# Patient Record
Sex: FEMALE | Race: ASIAN | NOT HISPANIC OR LATINO | Employment: FULL TIME | URBAN - METROPOLITAN AREA
[De-identification: names, ages, dates, MRNs, and addresses within clinical notes are randomized per-mention and may not be internally consistent; named-entity substitution may affect disease eponyms.]

---

## 2023-04-13 DIAGNOSIS — B00.1 COLD SORE: Primary | ICD-10-CM

## 2023-04-13 RX ORDER — VALACYCLOVIR HYDROCHLORIDE 1 G/1
1000 TABLET, FILM COATED ORAL 2 TIMES DAILY
Qty: 10 TABLET | Refills: 2 | Status: SHIPPED | OUTPATIENT
Start: 2023-04-13 | End: 2023-04-14

## 2023-07-10 DIAGNOSIS — G43.109 MIGRAINE WITH VERTIGO: ICD-10-CM

## 2023-07-11 RX ORDER — ONDANSETRON 4 MG/1
4 TABLET, ORALLY DISINTEGRATING ORAL EVERY 6 HOURS PRN
Qty: 20 TABLET | Refills: 0 | Status: SHIPPED | OUTPATIENT
Start: 2023-07-11

## 2023-07-20 ENCOUNTER — APPOINTMENT (OUTPATIENT)
Dept: LAB | Facility: HOSPITAL | Age: 58
End: 2023-07-20

## 2023-07-20 DIAGNOSIS — Z00.8 ENCOUNTER FOR OTHER GENERAL EXAMINATION: ICD-10-CM

## 2023-07-20 LAB
CHOLEST SERPL-MCNC: 190 MG/DL
EST. AVERAGE GLUCOSE BLD GHB EST-MCNC: 114 MG/DL
HBA1C MFR BLD: 5.6 %
HDLC SERPL-MCNC: 86 MG/DL
LDLC SERPL CALC-MCNC: 89 MG/DL (ref 0–100)
NONHDLC SERPL-MCNC: 104 MG/DL
TRIGL SERPL-MCNC: 73 MG/DL

## 2023-07-20 PROCEDURE — 83036 HEMOGLOBIN GLYCOSYLATED A1C: CPT

## 2023-07-20 PROCEDURE — 36415 COLL VENOUS BLD VENIPUNCTURE: CPT

## 2023-07-20 PROCEDURE — 80061 LIPID PANEL: CPT

## 2024-02-21 PROBLEM — Z01.419 ENCOUNTER FOR ANNUAL ROUTINE GYNECOLOGICAL EXAMINATION: Status: RESOLVED | Noted: 2019-10-02 | Resolved: 2024-02-21

## 2024-04-08 ENCOUNTER — OFFICE VISIT (OUTPATIENT)
Dept: FAMILY MEDICINE CLINIC | Facility: CLINIC | Age: 59
End: 2024-04-08
Payer: COMMERCIAL

## 2024-04-08 VITALS
DIASTOLIC BLOOD PRESSURE: 74 MMHG | BODY MASS INDEX: 24.15 KG/M2 | TEMPERATURE: 97.3 F | HEIGHT: 60 IN | RESPIRATION RATE: 16 BRPM | WEIGHT: 123 LBS | HEART RATE: 58 BPM | SYSTOLIC BLOOD PRESSURE: 110 MMHG | OXYGEN SATURATION: 99 %

## 2024-04-08 DIAGNOSIS — R53.83 FATIGUE, UNSPECIFIED TYPE: ICD-10-CM

## 2024-04-08 DIAGNOSIS — G43.109 MIGRAINE WITH VERTIGO: ICD-10-CM

## 2024-04-08 DIAGNOSIS — Z23 ENCOUNTER FOR IMMUNIZATION: ICD-10-CM

## 2024-04-08 DIAGNOSIS — Z12.31 ENCOUNTER FOR SCREENING MAMMOGRAM FOR BREAST CANCER: ICD-10-CM

## 2024-04-08 DIAGNOSIS — Z13.220 SCREENING, LIPID: ICD-10-CM

## 2024-04-08 DIAGNOSIS — Z13.1 SCREENING FOR DIABETES MELLITUS: ICD-10-CM

## 2024-04-08 DIAGNOSIS — Z00.00 ANNUAL PHYSICAL EXAM: Primary | ICD-10-CM

## 2024-04-08 PROCEDURE — 99396 PREV VISIT EST AGE 40-64: CPT | Performed by: PHYSICIAN ASSISTANT

## 2024-04-08 PROCEDURE — 90750 HZV VACC RECOMBINANT IM: CPT

## 2024-04-08 PROCEDURE — 90471 IMMUNIZATION ADMIN: CPT

## 2024-04-08 RX ORDER — ONDANSETRON 4 MG/1
4 TABLET, ORALLY DISINTEGRATING ORAL EVERY 6 HOURS PRN
Qty: 20 TABLET | Refills: 1 | Status: SHIPPED | OUTPATIENT
Start: 2024-04-08

## 2024-04-08 NOTE — PROGRESS NOTES
ADULT ANNUAL PHYSICAL  [facility]    NAME: Romero Guerin  AGE: 58 y.o. SEX: female  : 1965     DATE: 2024     Assessment and Plan:     Healthy 58 year old female    Immunizations and preventive care screenings were discussed with patient today. Appropriate education was printed on patient's after visit summary.    Counseling:  Alcohol/drug use: discussed moderation in alcohol intake, the recommendations for healthy alcohol use, and avoidance of illicit drug use.  Dental Health: discussed importance of regular tooth brushing, flossing, and dental visits.  Injury prevention: discussed safety/seat belts, safety helmets, smoke detectors, carbon dioxide detectors, and smoking near bedding or upholstery.  Sexual health: discussed sexually transmitted diseases, partner selection, use of condoms, avoidance of unintended pregnancy, and contraceptive alternatives.  Exercise: the importance of regular exercise/physical activity was discussed. Recommend exercise 3-5 times per week for at least 30 minutes.   Labs ordered  Shingrix #1       Follow up 2-6 months for Shingrix #2  Return in 1 year (on 2025).     Chief Complaint:     Chief Complaint   Patient presents with    Physical Exam      History of Present Illness:     Adult Annual Physical   Patient here for a comprehensive physical exam. The patient reports no problems.    Diet and Physical Activity  Diet/Nutrition: well balanced diet.   Exercise: walking.      Depression Screening  PHQ-2/9 Depression Screening    Little interest or pleasure in doing things: 0 - not at all  Feeling down, depressed, or hopeless: 0 - not at all  PHQ-2 Score: 0  PHQ-2 Interpretation: Negative depression screen       General Health  Sleep: sleeps well.   Hearing: normal - bilateral.  Vision: goes for regular eye exams and wears glasses.   Dental: regular dental visits and brushes teeth twice daily.        /GYN Health  Follows with gynecology? yes   Patient is: postmenopausal       Review of Systems:     Review of Systems   Constitutional: Negative.    HENT: Negative.     Eyes: Negative.    Respiratory: Negative.     Cardiovascular: Negative.    Gastrointestinal: Negative.    Endocrine: Negative.    Genitourinary: Negative.    Musculoskeletal: Negative.    Skin: Negative.    Allergic/Immunologic: Negative.    Neurological: Negative.    Hematological: Negative.    Psychiatric/Behavioral: Negative.        Past Medical History:     Past Medical History:   Diagnosis Date    Hepatitis B     Herpes     Infectious viral hepatitis     Hepatitis B--treated    Migraine     Miscarriage     Syphilis     patient is unsure of this, same incident as what she thought was herpes      Past Surgical History:     Past Surgical History:   Procedure Laterality Date     SECTION      COLONOSCOPY      2017-wnl repeat 10 years    FINGER SURGERY      MOUTH SURGERY      tooth implant and some extractions    WISDOM TOOTH EXTRACTION        Social History:     Social History     Socioeconomic History    Marital status:      Spouse name: Duc    Number of children: 2    Years of education: None    Highest education level: Associate degree: academic program   Occupational History    Occupation: RN-PACU- [employer]   Tobacco Use    Smoking status: Never    Smokeless tobacco: Never   Vaping Use    Vaping status: Never Used   Substance and Sexual Activity    Alcohol use: Yes     Comment: Rarely-once or twice a year    Drug use: Never    Sexual activity: Not Currently     Partners: Male     Birth control/protection: Abstinence     Comment: Lifetime partners: 2;  2016   Other Topics Concern    None   Social History Narrative    [employer]: no preference    Accepts blood products        Exercise: 2-3x/week    Calcium: 4 oz milk daily, 1 cheese a few times/week, 1 yogurt 2-3x.week, 1 multivitamin daily (for women >50)      Social Determinants of Health     Financial Resource Strain: Low Risk  (12/28/2020)    Overall Financial Resource Strain (CARDIA)     Difficulty of Paying Living Expenses: Not hard at all   Food Insecurity: No Food Insecurity (12/28/2020)    Hunger Vital Sign     Worried About Running Out of Food in the Last Year: Never true     Ran Out of Food in the Last Year: Never true   Transportation Needs: No Transportation Needs (12/28/2020)    PRAPARE - Transportation     Lack of Transportation (Medical): No     Lack of Transportation (Non-Medical): No   Physical Activity: Insufficiently Active (3/25/2019)    Exercise Vital Sign     Days of Exercise per Week: 2 days     Minutes of Exercise per Session: 30 min   Stress: No Stress Concern Present (3/25/2019)    [employer] - Occupational Stress Questionnaire     Feeling of Stress : Not at all   Social Connections: Moderately Isolated (3/25/2019)    Social Connection and Isolation Panel [NHANES]     Frequency of Communication with Friends and Family: Twice a week     Frequency of Social Gatherings with Friends and Family: Twice a week     Attends Jew Services: Never     Active Member of Clubs or Organizations: No     Attends Club or Organization Meetings: Never     Marital Status:    Intimate Partner Violence: Not At Risk (3/25/2019)    Humiliation, Afraid, Rape, and Kick questionnaire     Fear of Current or Ex-Partner: No     Emotionally Abused: No     Physically Abused: No     Sexually Abused: No   Housing Stability: Not on file      Family History:     Family History   Problem Relation Age of Onset    Hyperlipidemia Mother     Osteoporosis Mother     Hypertension Father     No Known Problems Sister     Hyperlipidemia Brother     No Known Problems Brother     No Known Problems Brother     Other Brother         meningiocele    No Known Problems Brother     Other Brother         missing in [place]    Substance Abuse Daughter      Mental illness Daughter     Mental illness Son     No Known Problems Maternal Grandmother     No Known Problems Maternal Grandfather     No Known Problems Paternal Grandmother     No Known Problems Paternal Grandfather     No Known Problems Maternal Aunt     No Known Problems Maternal Aunt     Breast cancer Paternal Aunt 60        fathers half sister; ? gyn ca as well?    No Known Problems Paternal Aunt     No Known Problems Paternal Aunt     Ovarian cancer Neg Hx     Colon cancer Neg Hx     Alcohol abuse Neg Hx     Uterine cancer Neg Hx       Current Medications:     Current Outpatient Medications   Medication Sig Dispense Refill    Biotin 1000 MCG tablet Take 1,000 mcg by mouth daily       fexofenadine (ALLEGRA) 180 MG tablet Take 180 mg by mouth if needed      fluticasone (FLONASE) 50 mcg/act nasal spray 1 spray into each nostril if needed      ibuprofen (MOTRIN) 200 mg tablet Take by mouth every 6 (six) hours as needed for mild pain      loratadine (CLARITIN) 10 mg tablet Take 10 mg by mouth if needed      magnesium oxide-pyridoxine (BEELITH) 362-20 MG TABS Take 1 tablet by mouth daily      meclizine (ANTIVERT) 12.5 MG tablet Take by mouth 3 (three) times a day as needed for dizziness      meloxicam (Mobic) 15 mg tablet Take 1 tablet (15 mg total) by mouth daily (Patient taking differently: Take 15 mg by mouth if needed) 30 tablet 0    Multiple Vitamin (MULTIVITAMIN) capsule Take 1 capsule by mouth daily      ondansetron (Zofran ODT) 4 mg disintegrating tablet Take 1 tablet (4 mg total) by mouth every 6 (six) hours as needed for nausea or vomiting 20 tablet 0    Turmeric 500 MG CAPS Take by mouth      estradiol (ESTRACE VAGINAL) 0.1 mg/g vaginal cream Insert one half gram vaginally twice weekly (Patient not taking: Reported on 4/8/2024) 42.5 g 0    valACYclovir (VALTREX) 1,000 mg tablet Take 1 tablet (1,000 mg total) by mouth 2 (two) times a day for 1 day 10 tablet 2     No current facility-administered  medications for this visit.      Allergies:     Allergies   Allergen Reactions    Sulfamethoxazole-Trimethoprim Hives, Itching and Rash      Physical Exam:     /74   Pulse 58   Temp (!) 97.3 °F (36.3 °C) (Temporal)   Resp 16   Ht 5' (1.524 m)   Wt 55.8 kg (123 lb)   LMP 12/23/2020 (Exact Date)   SpO2 99%   BMI 24.02 kg/m²     Physical Exam  Constitutional:       Appearance: Normal appearance. She is well-developed and normal weight.   HENT:      Head: Normocephalic and atraumatic.      Right Ear: Hearing, tympanic membrane, ear canal and external ear normal.      Left Ear: Hearing, tympanic membrane, ear canal and external ear normal.      Nose: Nose normal.      Mouth/Throat:      Mouth: Mucous membranes are moist.      Pharynx: Oropharynx is clear. Uvula midline.   Eyes:      Extraocular Movements: Extraocular movements intact.      Conjunctiva/sclera: Conjunctivae normal.      Pupils: Pupils are equal, round, and reactive to light.   Neck:      Thyroid: No thyromegaly.   Cardiovascular:      Rate and Rhythm: Normal rate and regular rhythm.      Heart sounds: Normal heart sounds. No murmur heard.  Pulmonary:      Effort: Pulmonary effort is normal.      Breath sounds: Normal breath sounds.   Abdominal:      General: Bowel sounds are normal. There is no distension.      Palpations: Abdomen is soft. There is no mass.      Tenderness: There is no abdominal tenderness.   Musculoskeletal:         General: Normal range of motion.      Cervical back: Normal range of motion and neck supple.   Lymphadenopathy:      Cervical: No cervical adenopathy.   Skin:     General: Skin is warm.   Neurological:      General: No focal deficit present.      Mental Status: She is alert and oriented to person, place, and time.      Cranial Nerves: No cranial nerve deficit.      Deep Tendon Reflexes: Reflexes normal.   Psychiatric:         Mood and Affect: Mood normal.         Behavior: Behavior normal.         Thought  Content: Thought content normal.         Judgment: Judgment normal.          Estefany Ramirez PA-C  [facility]

## 2024-04-09 ENCOUNTER — TELEPHONE (OUTPATIENT)
Dept: FAMILY MEDICINE CLINIC | Facility: CLINIC | Age: 59
End: 2024-04-09

## 2024-04-09 NOTE — TELEPHONE ENCOUNTER
Patient is calling to let us know she had a shingles vaccine yesterday and now is red, swelling and warm, headache and body aches.     Informed patient that is a normal side effect from  the vaccine needs to apply ice on/off at least, take tylenol for the pain and call us in a few days if is not better.

## 2024-04-11 ENCOUNTER — APPOINTMENT (OUTPATIENT)
Dept: LAB | Facility: HOSPITAL | Age: 59
End: 2024-04-11
Payer: COMMERCIAL

## 2024-04-11 DIAGNOSIS — Z13.1 SCREENING FOR DIABETES MELLITUS: ICD-10-CM

## 2024-04-11 DIAGNOSIS — R53.83 FATIGUE, UNSPECIFIED TYPE: ICD-10-CM

## 2024-04-11 DIAGNOSIS — Z13.220 SCREENING, LIPID: ICD-10-CM

## 2024-04-11 LAB
ALBUMIN SERPL BCP-MCNC: 4.3 G/DL (ref 3.5–5)
ALP SERPL-CCNC: 64 U/L (ref 34–104)
ALT SERPL W P-5'-P-CCNC: 13 U/L (ref 7–52)
ANION GAP SERPL CALCULATED.3IONS-SCNC: 8 MMOL/L (ref 4–13)
AST SERPL W P-5'-P-CCNC: 18 U/L (ref 13–39)
BASOPHILS # BLD AUTO: 0.01 THOUSANDS/ÂΜL (ref 0–0.1)
BASOPHILS NFR BLD AUTO: 0 % (ref 0–1)
BILIRUB SERPL-MCNC: 0.45 MG/DL (ref 0.2–1)
BUN SERPL-MCNC: 11 MG/DL (ref 5–25)
CALCIUM SERPL-MCNC: 8.8 MG/DL (ref 8.4–10.2)
CHLORIDE SERPL-SCNC: 105 MMOL/L (ref 96–108)
CHOLEST SERPL-MCNC: 190 MG/DL
CO2 SERPL-SCNC: 27 MMOL/L (ref 21–32)
CREAT SERPL-MCNC: 0.65 MG/DL (ref 0.6–1.3)
EOSINOPHIL # BLD AUTO: 0.06 THOUSAND/ÂΜL (ref 0–0.61)
EOSINOPHIL NFR BLD AUTO: 1 % (ref 0–6)
ERYTHROCYTE [DISTWIDTH] IN BLOOD BY AUTOMATED COUNT: 13.2 % (ref 11.6–15.1)
EST. AVERAGE GLUCOSE BLD GHB EST-MCNC: 123 MG/DL
GFR SERPL CREATININE-BSD FRML MDRD: 98 ML/MIN/1.73SQ M
GLUCOSE P FAST SERPL-MCNC: 78 MG/DL (ref 65–99)
HBA1C MFR BLD: 5.9 %
HCT VFR BLD AUTO: 39.1 % (ref 34.8–46.1)
HDLC SERPL-MCNC: 92 MG/DL
HGB BLD-MCNC: 12.8 G/DL (ref 11.5–15.4)
IMM GRANULOCYTES # BLD AUTO: 0.01 THOUSAND/UL (ref 0–0.2)
IMM GRANULOCYTES NFR BLD AUTO: 0 % (ref 0–2)
LDLC SERPL CALC-MCNC: 85 MG/DL (ref 0–100)
LYMPHOCYTES # BLD AUTO: 1.68 THOUSANDS/ÂΜL (ref 0.6–4.47)
LYMPHOCYTES NFR BLD AUTO: 39 % (ref 14–44)
MCH RBC QN AUTO: 29.3 PG (ref 26.8–34.3)
MCHC RBC AUTO-ENTMCNC: 32.7 G/DL (ref 31.4–37.4)
MCV RBC AUTO: 90 FL (ref 82–98)
MONOCYTES # BLD AUTO: 0.59 THOUSAND/ÂΜL (ref 0.17–1.22)
MONOCYTES NFR BLD AUTO: 14 % (ref 4–12)
NEUTROPHILS # BLD AUTO: 1.98 THOUSANDS/ÂΜL (ref 1.85–7.62)
NEUTS SEG NFR BLD AUTO: 46 % (ref 43–75)
NRBC BLD AUTO-RTO: 0 /100 WBCS
PLATELET # BLD AUTO: 160 THOUSANDS/UL (ref 149–390)
PMV BLD AUTO: 10 FL (ref 8.9–12.7)
POTASSIUM SERPL-SCNC: 4 MMOL/L (ref 3.5–5.3)
PROT SERPL-MCNC: 7.4 G/DL (ref 6.4–8.4)
RBC # BLD AUTO: 4.37 MILLION/UL (ref 3.81–5.12)
SODIUM SERPL-SCNC: 140 MMOL/L (ref 135–147)
TRIGL SERPL-MCNC: 64 MG/DL
TSH SERPL DL<=0.05 MIU/L-ACNC: 0.81 UIU/ML (ref 0.45–4.5)
WBC # BLD AUTO: 4.33 THOUSAND/UL (ref 4.31–10.16)

## 2024-04-11 PROCEDURE — 85025 COMPLETE CBC W/AUTO DIFF WBC: CPT

## 2024-04-11 PROCEDURE — 36415 COLL VENOUS BLD VENIPUNCTURE: CPT

## 2024-04-11 PROCEDURE — 80053 COMPREHEN METABOLIC PANEL: CPT

## 2024-04-11 PROCEDURE — 84443 ASSAY THYROID STIM HORMONE: CPT

## 2024-04-11 PROCEDURE — 83036 HEMOGLOBIN GLYCOSYLATED A1C: CPT

## 2024-04-11 PROCEDURE — 80061 LIPID PANEL: CPT

## 2024-04-15 ENCOUNTER — ANNUAL EXAM (OUTPATIENT)
Dept: OBGYN CLINIC | Facility: CLINIC | Age: 59
End: 2024-04-15
Payer: COMMERCIAL

## 2024-04-15 VITALS
SYSTOLIC BLOOD PRESSURE: 102 MMHG | HEIGHT: 60 IN | WEIGHT: 121.6 LBS | BODY MASS INDEX: 23.87 KG/M2 | DIASTOLIC BLOOD PRESSURE: 58 MMHG

## 2024-04-15 DIAGNOSIS — Z01.419 ENCOUNTER FOR GYNECOLOGICAL EXAMINATION WITHOUT ABNORMAL FINDING: Primary | ICD-10-CM

## 2024-04-15 DIAGNOSIS — Z12.31 ENCOUNTER FOR SCREENING MAMMOGRAM FOR BREAST CANCER: ICD-10-CM

## 2024-04-15 DIAGNOSIS — N95.2 ATROPHIC VAGINITIS: ICD-10-CM

## 2024-04-15 PROCEDURE — S0612 ANNUAL GYNECOLOGICAL EXAMINA: HCPCS | Performed by: NURSE PRACTITIONER

## 2024-04-15 NOTE — PROGRESS NOTES
Assessment / Plan    1. Encounter for gynecological examination without abnormal finding  Normal well woman exam  2021 pap/hpv negative.  Repeat next year.  Up to date on colonoscopy    2. Encounter for screening mammogram for breast cancer  Has appt scheduled.    3. Atrophic vaginitis  Patient declines use of estrogen vaginal cream, as she is not sexually active.        Latisha Brito is a 58 y.o. female who presents for her annual gynecologic exam.    57 yo   2021 pap/hpv negative  Pap hx NL  STD hx: remote hx of HSV, GC  2023 mammo negative; has scheduled 2024  Breast ca pat aunt  3/2017 colonoscopy, rpt 10 yr    Last pap:   Pap Hx: NL  STD hx: GC, ? Syphilis-   Sexually active: not currently;  for 1.5 yrs  Last mammogram:  Colonoscopy- 3/2017, 10 yr update  Calcium intake: inadequate; given guidelines  Exercise: sporadic; given guidelines  Safety: feels safe    Periods are absent  Current contraception: post menopausal status  History of abnormal Pap smear: no  Family history of breast,uterine, ovarian or colon cancer: breast ca pat aunt    Menstrual History:  OB History          3    Para   2    Term   2       0    AB   1    Living   2         SAB   1    IAB   0    Ectopic   0    Multiple        Live Births               Obstetric Comments   CS x 1   x 1    Menarche: 16    Menses: LMP 2019; PMB 20                    Patient's last menstrual period was 2020 (exact date).       The following portions of the patient's history were reviewed and updated as appropriate: allergies, current medications, past family history, past medical history, past social history, past surgical history, and problem list.    Review of Systems      Review of Systems  Breasts:  Negative for skin changes, dimpling, asymmetry, nipple discharge, redness, tenderness or palpable masses      Objective      /58 (BP Location: Left arm, Patient Position: Sitting,  Cuff Size: Standard)   Ht 5' (1.524 m)   Wt 55.2 kg (121 lb 9.6 oz)   LMP 12/23/2020 (Exact Date)   BMI 23.75 kg/m²   Physical Exam  Constitutional:       General: She is not in acute distress.     Appearance: Normal appearance. She is well-developed and normal weight. She is not ill-appearing or diaphoretic.   HENT:      Head: Normocephalic and atraumatic.   Eyes:      Pupils: Pupils are equal, round, and reactive to light.   Neck:      Thyroid: No thyromegaly.   Pulmonary:      Effort: Pulmonary effort is normal.   Chest:   Breasts:     Breasts are symmetrical.      Right: No inverted nipple, mass, nipple discharge, skin change or tenderness.      Left: No inverted nipple, mass, nipple discharge, skin change or tenderness.   Abdominal:      General: There is no distension.      Palpations: Abdomen is soft. There is no mass.      Tenderness: There is no abdominal tenderness. There is no guarding or rebound.   Genitourinary:     General: Normal vulva.      Exam position: Lithotomy position.      Labia:         Right: No rash, tenderness, lesion or injury.         Left: No rash, tenderness, lesion or injury.       Vagina: No signs of injury and foreign body. No vaginal discharge, erythema, tenderness or bleeding.      Cervix: No cervical motion tenderness, discharge or friability.      Uterus: Not enlarged and not tender.       Adnexa:         Right: No mass or tenderness.          Left: No mass or tenderness.     Musculoskeletal:      Cervical back: Neck supple.   Lymphadenopathy:      Cervical: No cervical adenopathy.      Upper Body:      Right upper body: No supraclavicular adenopathy.      Left upper body: No supraclavicular adenopathy.   Skin:     General: Skin is warm and dry.   Neurological:      General: No focal deficit present.      Mental Status: She is alert and oriented to person, place, and time.   Psychiatric:         Mood and Affect: Mood normal.         Behavior: Behavior normal.         Thought  Content: Thought content normal.         Judgment: Judgment normal.

## 2024-04-15 NOTE — PATIENT INSTRUCTIONS
Calcium and Osteoporosis   AMBULATORY CARE:   Why calcium is important for osteoporosis:  Calcium is important for osteoporosis because calcium helps build bone mass. Osteoporosis is a long-term medical condition that causes your body to break down more bone than it makes. Your bones become weak, brittle, and more likely to fracture.   Your calcium needs:   Women:      19 to 50 years: 1,000 mg    Over 50: 1,200 mg    Pregnant or breastfeeding, 19 years to 50 years: 1,000 mg    Men:      19 to 70: 1,000 mg    Over 70: 1,200 mg    Foods that are high in calcium:  The following list shows the number of calcium milligrams (mg) per serving. Your dietitian or healthcare provider can help you create a balanced meal plan for your calcium needs.  Dairy:      1 cup of low-fat plain yogurt (415 mg) or low-fat fruit yogurt (245 to 384 mg)    1½ ounces of shredded cheddar cheese (306 mg) or part skim mozzarella cheese (275 mg)    1 cup of skim, 2%, or whole milk (300 mg)    1 cup of cottage cheese made with 2% milk fat (138 mg)    ½ cup of frozen yogurt (103 mg)    Other foods:      1 cup of calcium-fortified orange juice (300 mg)    ½ cup of cooked mary greens (220 mg)    4 canned sardines, with bones (242 mg)    ½ cup of tofu (with added calcium) (204 mg)       How to get extra calcium:   Add powdered milk to puddings, cocoa, custard, or hot cereal.    Sift powdered milk into flour when you make cakes, cookies, or breads.    Use low-fat or fat-free milk instead of water in pancake mix, mashed potatoes, pudding, or hot breakfast cereal.    Add low-fat or fat-free cheese to salad, soup, or pasta.    Add tofu (with added calcium) to vegetable stir-newman.    Take calcium supplements if you cannot get enough calcium from the foods you eat. Your body can absorb the most calcium from supplements when you take 500 mg or less at one time. Do not take more than 2,500 mg of calcium supplements each day.    Follow up with your doctor or  dietitian as directed:  Write down your questions so you remember to ask them during your visits.  © Copyright [employer] 2023 Information is for End User's use only and may not be sold, redistributed or otherwise used for commercial purposes.  The above information is an  only. It is not intended as medical advice for individual conditions or treatments. Talk to your doctor, nurse or pharmacist before following any medical regimen to see if it is safe and effective for you.    EXERCISE RECOMMENDATIONS:  Recommend regular exercise, 30 minutes of cardiovascular, 4-5 times a week (brisk walking, jogging, biking, elliptical).

## 2024-05-03 ENCOUNTER — HOSPITAL ENCOUNTER (OUTPATIENT)
Dept: MAMMOGRAPHY | Facility: CLINIC | Age: 59
Discharge: HOME | End: 2024-05-03
Payer: COMMERCIAL

## 2024-05-03 VITALS — BODY MASS INDEX: 23.75 KG/M2 | HEIGHT: 60 IN | WEIGHT: 121 LBS

## 2024-05-03 DIAGNOSIS — Z12.31 ENCOUNTER FOR SCREENING MAMMOGRAM FOR BREAST CANCER: ICD-10-CM

## 2024-05-03 PROCEDURE — 77067 SCR MAMMO BI INCL CAD: CPT

## 2024-05-03 PROCEDURE — 77063 BREAST TOMOSYNTHESIS BI: CPT

## 2024-07-12 ENCOUNTER — OFFICE VISIT (OUTPATIENT)
Dept: FAMILY MEDICINE CLINIC | Facility: CLINIC | Age: 59
End: 2024-07-12
Payer: COMMERCIAL

## 2024-07-12 ENCOUNTER — APPOINTMENT (OUTPATIENT)
Dept: RADIOLOGY | Facility: CLINIC | Age: 59
End: 2024-07-12
Payer: COMMERCIAL

## 2024-07-12 VITALS
RESPIRATION RATE: 16 BRPM | OXYGEN SATURATION: 98 % | WEIGHT: 119 LBS | TEMPERATURE: 97.5 F | DIASTOLIC BLOOD PRESSURE: 70 MMHG | HEART RATE: 68 BPM | BODY MASS INDEX: 23.36 KG/M2 | HEIGHT: 60 IN | SYSTOLIC BLOOD PRESSURE: 112 MMHG

## 2024-07-12 DIAGNOSIS — M54.50 ACUTE LEFT-SIDED LOW BACK PAIN WITHOUT SCIATICA: ICD-10-CM

## 2024-07-12 DIAGNOSIS — M54.40 ACUTE LEFT-SIDED LOW BACK PAIN WITH SCIATICA, SCIATICA LATERALITY UNSPECIFIED: Primary | ICD-10-CM

## 2024-07-12 PROCEDURE — 72110 X-RAY EXAM L-2 SPINE 4/>VWS: CPT

## 2024-07-12 PROCEDURE — 99213 OFFICE O/P EST LOW 20 MIN: CPT | Performed by: PHYSICIAN ASSISTANT

## 2024-07-12 RX ORDER — METHOCARBAMOL 750 MG/1
750 TABLET, FILM COATED ORAL EVERY 6 HOURS PRN
Qty: 30 TABLET | Refills: 0 | Status: SHIPPED | OUTPATIENT
Start: 2024-07-12

## 2024-07-12 RX ORDER — PHENOL 1.4 %
AEROSOL, SPRAY (ML) MUCOUS MEMBRANE
COMMUNITY
Start: 2024-05-01

## 2024-07-12 NOTE — PROGRESS NOTES
Assessment/Plan:    Low back pain - Xr low back, refer to PT, robaxin prn at night    Left hip pain - move likely from low back, PT to eval    F/u as needed    Subjective:   Chief Complaint   Patient presents with    Joint Pain     Ongoing low back pain, left hip and left wrist pain  Difficulty changing positions  Started a few months ago. Comes and goes      Patient ID: Romero Guerin is a 58 y.o. female.    Patient here complaining of back pain off and on for a while. Usually with yoga and stretching better but now not getting better. Better with walking. Worse with sitting, laying prolong. More on left side and low back. Across back radiating to front. Using heat pad. Has been on going for 3-4 months.     Back Pain  The current episode started more than 1 month ago. The problem occurs constantly. The problem has been waxing and waning since onset. The pain is present in the sacro-iliac. The quality of the pain is described as aching and cramping. The pain is at a severity of 8/10. The pain is The same all the time. The symptoms are aggravated by bending, position, lying down, sitting and twisting. Stiffness is present All day. Associated symptoms include pelvic pain.       The following portions of the patient's history were reviewed and updated as appropriate: allergies, current medications, past family history, past medical history, past social history, past surgical history, and problem list.    Past Medical History:   Diagnosis Date    Gonorrhea     Hepatitis B     Herpes     PERIORAL    Infectious viral hepatitis     Hepatitis B--treated    Migraine     Miscarriage     Syphilis     patient is unsure of this, same incident as what she thought was herpes     Past Surgical History:   Procedure Laterality Date     SECTION      COLONOSCOPY      2017-wnl repeat 10 years    FINGER SURGERY      MOUTH SURGERY      tooth implant and some extractions    WISDOM TOOTH EXTRACTION       Family  History   Problem Relation Age of Onset    Hyperlipidemia Mother     Osteoporosis Mother     Hypertension Father     Cancer Father 80         2023, tonsil cancer    Thyroid disease Father         Thyroid nodules,  Dec 2023    No Known Problems Sister     Substance Abuse Daughter     Mental illness Daughter     No Known Problems Maternal Grandmother     No Known Problems Maternal Grandfather     No Known Problems Paternal Grandmother     No Known Problems Paternal Grandfather     Hyperlipidemia Brother     No Known Problems Brother     No Known Problems Brother     Other Brother         meningiocele    No Known Problems Brother     Other Brother         missing in [place]    Mental illness Son     No Known Problems Maternal Aunt     No Known Problems Maternal Aunt     Breast cancer Paternal Aunt 60        fathers half sister; ? gyn ca as well?    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[employer]- [employer]   Tobacco Use    Smoking status: Never    Smokeless tobacco: Never   Vaping Use    Vaping status: Never Used   Substance and Sexual Activity    Alcohol use: Yes     Comment: Rarely-once or twice a year    Drug use: Never    Sexual activity: Not Currently     Partners: Male     Birth control/protection: Post-menopausal     Comment: Lifetime partners: 2;  1 1/2 yrs   Other Topics Concern    Not on file   Social History Narrative    Taoism: no preference    Accepts blood products        Exercise: 2-3x/week    Calcium: 4 oz milk daily, 1 cheese a few times/week, 1 yogurt 2-3x.week, 1 multivitamin daily (for women  >50)     Social Determinants of Health     Financial Resource Strain: Low Risk  (12/28/2020)    Overall Financial Resource Strain (CARDIA)     Difficulty of Paying Living Expenses: Not hard at all   Food Insecurity: No Food Insecurity (12/28/2020)    Hunger Vital Sign     Worried About Running Out of Food in the Last Year: Never true     Ran Out of Food in the Last Year: Never true   Transportation Needs: No Transportation Needs (12/28/2020)    PRAPARE - Transportation     Lack of Transportation (Medical): No     Lack of Transportation (Non-Medical): No   Physical Activity: Insufficiently Active (3/25/2019)    Exercise Vital Sign     Days of Exercise per Week: 2 days     Minutes of Exercise per Session: 30 min   Stress: No Stress Concern Present (3/25/2019)    [place] citizen [place] [employer] - Occupational Stress Questionnaire     Feeling of Stress : Not at all   Social Connections: Moderately Isolated (3/25/2019)    Social Connection and Isolation Panel [NHANES]     Frequency of Communication with Friends and Family: Twice a week     Frequency of Social Gatherings with Friends and Family: Twice a week     Attends Hinduism Services: Never     Active Member of Clubs or Organizations: No     Attends Club or Organization Meetings: Never     Marital Status:    Intimate Partner Violence: Not At Risk (3/25/2019)    Humiliation, Afraid, Rape, and Kick questionnaire     Fear of Current or Ex-Partner: No     Emotionally Abused: No     Physically Abused: No     Sexually Abused: No   Housing Stability: Not on file       Current Outpatient Medications:     Biotin 1000 MCG tablet, Take 1,000 mcg by mouth daily , Disp: , Rfl:     calcium carbonate (Calcium 600) 600 MG tablet, , Disp: , Rfl:     fexofenadine (ALLEGRA) 180 MG tablet, Take 180 mg by mouth if needed, Disp: , Rfl:     fluticasone (FLONASE) 50 mcg/act nasal spray, 1 spray into each nostril if needed, Disp: , Rfl:     ibuprofen (MOTRIN) 200 mg tablet,  Take by mouth every 6 (six) hours as needed for mild pain, Disp: , Rfl:     loratadine (CLARITIN) 10 mg tablet, Take 10 mg by mouth if needed, Disp: , Rfl:     magnesium oxide-pyridoxine (BEELITH) 362-20 MG TABS, Take 1 tablet by mouth daily, Disp: , Rfl:     meclizine (ANTIVERT) 12.5 MG tablet, Take by mouth 3 (three) times a day as needed for dizziness, Disp: , Rfl:     meloxicam (Mobic) 15 mg tablet, Take 1 tablet (15 mg total) by mouth daily (Patient taking differently: Take 15 mg by mouth if needed), Disp: 30 tablet, Rfl: 0    Multiple Vitamin (MULTIVITAMIN) capsule, Take 1 capsule by mouth daily, Disp: , Rfl:     ondansetron (Zofran ODT) 4 mg disintegrating tablet, Take 1 tablet (4 mg total) by mouth every 6 (six) hours as needed for nausea or vomiting, Disp: 20 tablet, Rfl: 1    Turmeric 500 MG CAPS, Take by mouth, Disp: , Rfl:     valACYclovir (VALTREX) 1,000 mg tablet, Take 1 tablet (1,000 mg total) by mouth 2 (two) times a day for 1 day (Patient not taking: Reported on 4/15/2024), Disp: 10 tablet, Rfl: 2    Review of Systems   Genitourinary:  Positive for pelvic pain.   Musculoskeletal:  Positive for back pain.             Objective:    Vitals:    07/12/24 1121   BP: 112/70   Pulse: 68   Resp: 16   Temp: 97.5 °F (36.4 °C)   TempSrc: Temporal   SpO2: 98%   Weight: 54 kg (119 lb)   Height: 5' (1.524 m)        Physical Exam  Constitutional:       Appearance: Normal appearance. She is well-developed and normal weight.   HENT:      Head: Normocephalic and atraumatic.   Musculoskeletal:         General: Normal range of motion.      Comments: Low  over lumbar region and paraspinal muscles, full Rom with discomfort, heel/toe walk normal, SLR negative b/l    Left hip tender laterally, full ROM with no pain   Skin:     General: Skin is warm.   Neurological:      General: No focal deficit present.      Mental Status: She is alert and oriented to person, place, and time.      Sensory: No sensory deficit.       Motor: No weakness.      Gait: Gait normal.      Deep Tendon Reflexes: Reflexes normal.   Psychiatric:         Mood and Affect: Mood normal.         Behavior: Behavior normal.         Thought Content: Thought content normal.         Judgment: Judgment normal.

## 2024-07-15 ENCOUNTER — NURSE TRIAGE (OUTPATIENT)
Dept: PHYSICAL THERAPY | Facility: OTHER | Age: 59
End: 2024-07-15

## 2024-07-15 ENCOUNTER — TELEPHONE (OUTPATIENT)
Dept: PHYSICAL THERAPY | Facility: OTHER | Age: 59
End: 2024-07-15

## 2024-07-15 DIAGNOSIS — M54.50 ACUTE LEFT-SIDED LOW BACK PAIN, UNSPECIFIED WHETHER SCIATICA PRESENT: Primary | ICD-10-CM

## 2024-07-15 NOTE — TELEPHONE ENCOUNTER
Additional Information   Negative: Has the patient had unexplained weight loss?   Negative: Does the patient have a fever?   Negative: Is the patient experiencing urine retention?   Negative: Is the patient experiencing acute drop foot or paralysis?   Negative: Has the patient experienced major trauma? (fall from height, high speed collision, direct blow to spine) and is also experiencing nausea, light-headedness, or loss of consciousness?   Negative: Is the patient experiencing blood in sputum?   Negative: Is this a chronic condition?    Protocols used: [facility] [place] Protocol  Nurse completed triage and NO RF s/s present. Referral entered for the [place] site and contact/phone number info given to the patient as well.   Patients information was sent to the preferred site and pt made aware clerical would be calling to schedule the evaluation appointment. Nurse encouraged her to call the site if she does not hear from clerical beforehand. Patient Agreed.    Nurse also offered a call from the  counselor d/t SBT score. Patient declined. Patient was pleasant and appropriate during this encounter.   Patient did not voice any additional questions or concerns at this time.   Patient is aware current/past complaints, relevant dx, additional referrals and treatment/options will be discussed at the evaluation/consultation appointment.   Patient is in agreement with plan to be evaluated by [facility]  therapist/DPT.   Patient very appreciative of CB and referral placement.     Nurse wished her well and referral closed.

## 2024-07-15 NOTE — TELEPHONE ENCOUNTER
Call placed to the patient per [facility] referral.    V/m left for patient to call back. Phone number and hours of business provided.    This is the 1st attempt to reach the patient. Will defer referral per protocol.

## 2024-07-15 NOTE — TELEPHONE ENCOUNTER
Additional Information   Negative: Is this related to a work injury?   Negative: Is this related to an MVA?   Negative: Are you currently recieving homecare services?    Background - Initial Assessment  Clinical complaint: Pain is bilat low back, worst being center low back, radiates down left hip and buttock to top of thigh. No numbness, Will get on and off tingling in bilat toes and fingers. This usually occurs with certain positions. Has had this pain for 3 months. Was self treating and wanted to get checked out, since pain is more persistent as of late. No prior back SX, No injury or trauma to create the pain. Pt works in the recovery room, and is on her feet all day, lifting patients and pushing/pulling. Was seen by PCP 7/12/24. Has no pain if walking. Pain is persistent and feels aching, shooting and at times burning.   Date of onset: 3 months  Frequency of pain: persistent  Quality of pain: aching, burning, and shooting    Protocols used: [facility] Protocol

## 2024-07-18 ENCOUNTER — EVALUATION (OUTPATIENT)
Dept: PHYSICAL THERAPY | Facility: CLINIC | Age: 59
End: 2024-07-18
Payer: COMMERCIAL

## 2024-07-18 VITALS — DIASTOLIC BLOOD PRESSURE: 57 MMHG | SYSTOLIC BLOOD PRESSURE: 87 MMHG | TEMPERATURE: 98.5 F | HEART RATE: 60 BPM

## 2024-07-18 DIAGNOSIS — M54.50 ACUTE LEFT-SIDED LOW BACK PAIN, UNSPECIFIED WHETHER SCIATICA PRESENT: ICD-10-CM

## 2024-07-18 PROCEDURE — 97112 NEUROMUSCULAR REEDUCATION: CPT | Performed by: PHYSICAL THERAPIST

## 2024-07-18 PROCEDURE — 97161 PT EVAL LOW COMPLEX 20 MIN: CPT | Performed by: PHYSICAL THERAPIST

## 2024-07-18 NOTE — TELEPHONE ENCOUNTER
Additional Information  • Negative: Is this related to a work injury?  • Negative: Is this related to an MVA?  • Negative: Are you currently recieving homecare services?  • Negative: Has the patient had unexplained weight loss?  • Negative: Does the patient have a fever?  • Negative: Is the patient experiencing urine retention?  • Negative: Is the patient experiencing acute drop foot or paralysis?  • Negative: Has the patient experienced major trauma? (fall from height, high speed collision, direct blow to spine) and is also experiencing nausea, light-headedness, or loss of consciousness?  • Negative: Is the patient experiencing blood in sputum?  • Negative: Is this a chronic condition?    Protocols used: [facility] Protocol

## 2024-07-18 NOTE — PROGRESS NOTES
PT Evaluation     Today's date: 2024  Patient name: Romero Guerin  : 1965  MRN: [id number]  Referring provider: Chepe Wadsworth PT  Dx:   Encounter Diagnosis     ICD-10-CM    1. Acute left-sided low back pain, unspecified whether sciatica present  M54.50 Ambulatory referral to PT spine                     Assessment  Functional limitations: limited sitting and lying tolerance, pain with functional position changes, limited participation in recreational exercise    Assessment details: Romero Guerin is a 59 yo female with acute low back pain radiating into her left hip/buttock. Her pain appears to be mechanical in nature, abolishing with repeated lumbar extensions in prone. She does present with bilateral glute weakness, decreased flexibility, and positive prone instability test. She is an excellent candidate for OPPT to address the above impairments and optimize functional status.     Goals  4-6 weeks  1. Increase B hip extensor strength by 1 MMT grade to indicate improved core stability.  2. Tolerate sitting 1 hour without onset of pain to allow resuming PLOF.  3. Normalize BLE flexibility to promote good body mechanics during daily tasks.  4. Independent with HEP at discharge.  5. Decrease max pain to no more than 4/10 to increase functional activity tolerance.  6. Eliminate radicular pain with repeated motions of lumbar spine.       Plan  Patient would benefit from: PT eval and skilled physical therapy    Planned therapy interventions: manual therapy, neuromuscular re-education, patient/caregiver education, therapeutic activities, therapeutic exercise and home exercise program    Frequency: 2x week  Duration in weeks: 6  Treatment plan discussed with: patient  Plan details: Provided with and reviewed initial written HEP.  Reviewed physical exam findings and plan of care.  All questions answered to patient's satisfaction.          Subjective Evaluation    History of Present Illness  Mechanism of injury:  Patient reports low back pain for the last few months with no apparent mechanism of injury. She reports if she does yoga stretches daily it helps. The pain will radiate into her left thigh intermittently, usually when she sleeps. She saw her PCP and was referred to [facility] for OPPT evaluation. She was prescribed a muscle relaxer which she reports helped 50% but made her very sleepy. Denies recent fevers, major traumas, or B/B changes.   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 Mild facet disease as well. Minimal retrolisthesis L2 on L3 and L3 on L4.  Treatments  Previous treatment: medication        Objective  Dermatomes: normal bilat  Myotomes: normal bilat except glutes 4/5 L  Mechanical Assessment: pain eliminated with REIL, peripheralizes with ACE  SLR: normal  Crossed SLR: normal  Prone Instability: +L4  [place] Sign: + aberrant movement  Lumbar PAIVM: normal mobility                 Hip IR ROM: normal bilat  Hip ER ROM: normal bilat, tight painful piriformis on L  Prone Quad length: normal bilat  Hamstrings at 90-90 length: minimally restricted bilat  TA Recruitment/Endurance: fair recruitment, poor endurance    HEP provided as follows:  Access Code: [email or web address]  URL: [email or web address]  Date: 2024  Prepared by: Maddie Lake    Exercises  - Prone Press Up  - 5-8 x daily - 7 x weekly - 10 reps  - Standing Lumbar Extension at Wall - Forearms  - 5-8 x daily - 7 x weekly - 10 reps  - Hooklying Transversus Abdominis Palpation  - 3 x daily - 7 x weekly - 10 reps - 5 hold  - Supine Figure 4 Piriformis Stretch with Leg Extension  - 3 x daily - 7 x weekly - 2 reps - 30 hold  - Pilates Bridge  - 3 x daily - 7 x weekly -  10 reps         Precautions: low BP      Manuals 7/18                                                                Neuro Re-Ed             TA             TA + bent knee fall out             TA + bridge             TA + ball sq             TA + heel taps if able             TA + donkey kicks             TA + treadmill             Pt ed HEP rev KT            Ther Ex             REIL vs ACE at wall             Piriformis str             Hamstring str w/ strap             Prone hip ext                                                                 Ther Activity                                       Gait Training                                       Modalities

## 2024-07-30 ENCOUNTER — OFFICE VISIT (OUTPATIENT)
Dept: PHYSICAL THERAPY | Facility: CLINIC | Age: 59
End: 2024-07-30
Payer: COMMERCIAL

## 2024-07-30 DIAGNOSIS — M54.50 ACUTE LEFT-SIDED LOW BACK PAIN, UNSPECIFIED WHETHER SCIATICA PRESENT: Primary | ICD-10-CM

## 2024-07-30 PROCEDURE — 97112 NEUROMUSCULAR REEDUCATION: CPT | Performed by: PHYSICAL THERAPIST

## 2024-07-30 PROCEDURE — 97110 THERAPEUTIC EXERCISES: CPT | Performed by: PHYSICAL THERAPIST

## 2024-07-30 NOTE — PROGRESS NOTES
"Daily Note     Today's date: 2024  Patient name: Romero Guerin  : 1965  MRN: [id number]  Referring provider: Chepe Wadsworth, PT  Dx:   Encounter Diagnosis     ICD-10-CM    1. Acute left-sided low back pain, unspecified whether sciatica present  M54.50                      Subjective: Pt reports the exercises are helping her and she had been feeling well until she moved a bed at work yesterday. Sore today but not as bad as she had been initially.      Objective: See treatment diary below      Assessment: Tolerated treatment well. Patient exhibited good technique with therapeutic exercises and would benefit from continued PT      Plan: Continue per plan of care.      Precautions: low BP      Manuals                                                                Neuro Re-Ed             TA  5\"x20           TA + bent knee fall out             TA + bridge  5\"x20           TA + ball sq  5\"x20           TA + heel taps if able             TA + donkey kicks  20x           TA + treadmill  5'           Pt ed HEP rev KT            Ther Ex             REIL vs ACE at wall  10x at wall           Piriformis str  30\"x3           Hamstring str w/ strap  30\"x3           Prone hip ext  Back pain                                                               Ther Activity                                       Gait Training                                       Modalities                                            "

## 2024-08-02 ENCOUNTER — OFFICE VISIT (OUTPATIENT)
Dept: PHYSICAL THERAPY | Facility: CLINIC | Age: 59
End: 2024-08-02
Payer: COMMERCIAL

## 2024-08-02 DIAGNOSIS — M54.50 ACUTE LEFT-SIDED LOW BACK PAIN, UNSPECIFIED WHETHER SCIATICA PRESENT: Primary | ICD-10-CM

## 2024-08-02 PROCEDURE — 97112 NEUROMUSCULAR REEDUCATION: CPT | Performed by: PHYSICAL THERAPIST

## 2024-08-02 PROCEDURE — 97110 THERAPEUTIC EXERCISES: CPT | Performed by: PHYSICAL THERAPIST

## 2024-08-02 NOTE — PROGRESS NOTES
"Daily Note     Today's date: 2024  Patient name: Romero Guerin  : 1965  MRN: [id number]  Referring provider: Chepe Wadsworth, PT  Dx:   Encounter Diagnosis     ICD-10-CM    1. Acute left-sided low back pain, unspecified whether sciatica present  M54.50                      Subjective: Pt reports not having the thigh/knee pain as much but her back is hurting more.       Objective: See treatment diary below      Assessment: Tolerated treatment well. Patient exhibited good technique with therapeutic exercises and would benefit from continued PT. Repeated end range lumbar extension eliminated pain. Reviewed with patient to perform multiple times per day, appx every 1-2 hours.       Plan: Continue per plan of care.      Precautions: low BP      Manuals                                                               Neuro Re-Ed             TA  5\"x20 5\"x20          TA + bent knee fall out   20x          TA + bridge  5\"x20 5\"x20          TA + ball sq  5\"x20 5\"x20          TA + heel taps if able             TA + donkey kicks  20x 20x          TA + treadmill  5' 5'          Pt ed HEP rev KT            Ther Ex             REIL vs ACE at wall  10x at wall 10x at wall          Piriformis str  30\"x3 30\"x3          Hamstring str w/ strap  30\"x3 30\"x3          Prone hip ext  Back pain                                                               Ther Activity                                       Gait Training                                       Modalities                                              "

## 2024-08-15 ENCOUNTER — OFFICE VISIT (OUTPATIENT)
Dept: PHYSICAL THERAPY | Facility: CLINIC | Age: 59
End: 2024-08-15
Payer: COMMERCIAL

## 2024-08-15 DIAGNOSIS — M54.50 ACUTE LEFT-SIDED LOW BACK PAIN, UNSPECIFIED WHETHER SCIATICA PRESENT: Primary | ICD-10-CM

## 2024-08-15 PROCEDURE — 97110 THERAPEUTIC EXERCISES: CPT | Performed by: PHYSICAL THERAPIST

## 2024-08-15 PROCEDURE — 97112 NEUROMUSCULAR REEDUCATION: CPT | Performed by: PHYSICAL THERAPIST

## 2024-08-15 NOTE — PROGRESS NOTES
"Daily Note     Today's date: 8/15/2024  Patient name: Romero Guerin  : 1965  MRN: [id number]  Referring provider: Chepe Wadsworth, PT  Dx:   Encounter Diagnosis     ICD-10-CM    1. Acute left-sided low back pain, unspecified whether sciatica present  M54.50                      Subjective: Pt reports she's feeling about 90% better with her leg pain. She feels she can be discharged to [facility].       Objective: See treatment diary below      Assessment: Tolerated treatment well. Patient exhibited good technique with therapeutic exercises. Good carryover with program. Reviewed final HEP.       Plan:  Discharge to HEP at patient's request     Precautions: low BP      Manuals 7/18 7/30 8/2 8/15                                                             Neuro Re-Ed             TA  5\"x20 5\"x20 5\"x20         TA + bent knee fall out   20x 20x         TA + bridge  5\"x20 5\"x20 5\"x20         TA + ball sq  5\"x20 5\"x20 5\"x20         TA + heel taps if able             TA + donkey kicks  20x 20x 20x         TA + treadmill  5' 5' 10'         Pt ed HEP rev KT            Ther Ex             REIL vs ACE at wall  10x at wall 10x at wall 10x at wall         Piriformis str  30\"x3 30\"x3 30\"x3         Hamstring str w/ strap  30\"x3 30\"x3 30\"x3         Prone hip ext  Back pain                                                               Ther Activity                                       Gait Training                                       Modalities                                                "

## 2024-08-16 ENCOUNTER — OFFICE VISIT (OUTPATIENT)
Dept: URGENT CARE | Facility: CLINIC | Age: 59
End: 2024-08-16
Payer: COMMERCIAL

## 2024-08-16 VITALS
RESPIRATION RATE: 18 BRPM | DIASTOLIC BLOOD PRESSURE: 70 MMHG | SYSTOLIC BLOOD PRESSURE: 118 MMHG | TEMPERATURE: 98.6 F | BODY MASS INDEX: 23.83 KG/M2 | HEART RATE: 64 BPM | OXYGEN SATURATION: 99 % | WEIGHT: 122 LBS

## 2024-08-16 DIAGNOSIS — R21 RASH: Primary | ICD-10-CM

## 2024-08-16 PROCEDURE — 99204 OFFICE O/P NEW MOD 45 MIN: CPT | Performed by: EMERGENCY MEDICINE

## 2024-08-16 RX ORDER — PREDNISONE 10 MG/1
TABLET ORAL
Qty: 54 TABLET | Refills: 0 | Status: SHIPPED | OUTPATIENT
Start: 2024-08-16 | End: 2024-08-30

## 2024-08-19 NOTE — PROGRESS NOTES
[facility]        NAME: Romero Guerin is a 58 y.o. female  : 1965    MRN: [id number]  DATE: 2024  TIME: 8:55 AM    Assessment and Plan   Rash [R21]  1. Rash  predniSONE 10 mg tablet            Patient Instructions       Follow up with PCP in 3-5 days.  Proceed to  ER if symptoms worsen.    If tests have been performed at [facility], our office will contact you with results if changes need to be made to the care plan discussed with you at the visit.  [facility] on [facility]    Chief Complaint     Chief Complaint   Patient presents with    Rash     Started 2 weeks ago with itchy rash to Right arm, has now spread to other arm, using hydrocortisone cream with some relief          History of Present Illness       Rash  This is a new problem. The current episode started 1 to 4 weeks ago. The problem has been gradually worsening since onset. The rash is diffuse. The problem is moderate. The rash is characterized by itchiness, swelling and redness. She was exposed to poison ivy/oak. Associated symptoms include itching. Pertinent negatives include no anorexia, congestion, cough, decreased physical activity, decreased responsiveness, decreased sleep, drinking less, diarrhea, facial edema, fatigue, fever, joint pain, rhinorrhea, shortness of breath, sore throat or vomiting. Past treatments include antihistamine and topical steroids. The treatment provided no relief. There is no history of allergies, asthma, eczema or varicella. There were no sick contacts.       Review of Systems   Review of Systems   Constitutional:  Negative for activity change, appetite change, chills, decreased responsiveness, diaphoresis, fatigue, fever and unexpected weight change.   HENT:  Negative for congestion, dental problem, drooling, ear discharge, ear pain, facial swelling, hearing loss, mouth sores, nosebleeds, postnasal drip, rhinorrhea, sinus pressure, sinus pain, sneezing, sore throat,  tinnitus, trouble swallowing and voice change.    Eyes:  Negative for pain and visual disturbance.   Respiratory:  Negative for cough and shortness of breath.    Cardiovascular:  Negative for chest pain and palpitations.   Gastrointestinal:  Negative for abdominal pain, anorexia, diarrhea and vomiting.   Genitourinary:  Negative for dysuria and hematuria.   Musculoskeletal:  Negative for arthralgias, back pain, gait problem, joint pain, joint swelling, myalgias, neck pain and neck stiffness.   Skin:  Positive for itching and rash. Negative for color change, pallor and wound.   Neurological:  Negative for dizziness, tremors, seizures, syncope, facial asymmetry, speech difficulty, weakness, light-headedness, numbness and headaches.   All other systems reviewed and are negative.        Current Medications       Current Outpatient Medications:     predniSONE 10 mg tablet, Take 6 tablets (60 mg total) by mouth daily for 4 days, THEN 5 tablets (50 mg total) daily for 2 days, THEN 4 tablets (40 mg total) daily for 2 days, THEN 3 tablets (30 mg total) daily for 2 days, THEN 2 tablets (20 mg total) daily for 2 days, THEN 1 tablet (10 mg total) daily for 2 days., Disp: 54 tablet, Rfl: 0    Biotin 1000 MCG tablet, Take 1,000 mcg by mouth daily , Disp: , Rfl:     calcium carbonate (Calcium 600) 600 MG tablet, , Disp: , Rfl:     fexofenadine (ALLEGRA) 180 MG tablet, Take 180 mg by mouth if needed, Disp: , Rfl:     fluticasone (FLONASE) 50 mcg/act nasal spray, 1 spray into each nostril if needed, Disp: , Rfl:     ibuprofen (MOTRIN) 200 mg tablet, Take by mouth every 6 (six) hours as needed for mild pain, Disp: , Rfl:     loratadine (CLARITIN) 10 mg tablet, Take 10 mg by mouth if needed, Disp: , Rfl:     magnesium oxide-pyridoxine (BEELITH) 362-20 MG TABS, Take 1 tablet by mouth daily, Disp: , Rfl:     meclizine (ANTIVERT) 12.5 MG tablet, Take by mouth 3 (three) times a day as needed for dizziness, Disp: , Rfl:     meloxicam  (Mobic) 15 mg tablet, Take 1 tablet (15 mg total) by mouth daily (Patient taking differently: Take 15 mg by mouth if needed), Disp: 30 tablet, Rfl: 0    methocarbamol (ROBAXIN) 750 mg tablet, Take 1 tablet (750 mg total) by mouth every 6 (six) hours as needed for muscle spasms, Disp: 30 tablet, Rfl: 0    Multiple Vitamin (MULTIVITAMIN) capsule, Take 1 capsule by mouth daily, Disp: , Rfl:     ondansetron (Zofran ODT) 4 mg disintegrating tablet, Take 1 tablet (4 mg total) by mouth every 6 (six) hours as needed for nausea or vomiting, Disp: 20 tablet, Rfl: 1    valACYclovir (VALTREX) 1,000 mg tablet, Take 1 tablet (1,000 mg total) by mouth 2 (two) times a day for 1 day (Patient not taking: Reported on 4/15/2024), Disp: 10 tablet, Rfl: 2    Current Allergies     Allergies as of 2024 - Reviewed 2024   Allergen Reaction Noted    Sulfamethoxazole-trimethoprim Hives, Itching, and Rash 2019            The following portions of the patient's history were reviewed and updated as appropriate: allergies, current medications, past family history, past medical history, past social history, past surgical history and problem list.     Past Medical History:   Diagnosis Date    Gonorrhea     Hepatitis B     Herpes     PERIORAL    Infectious viral hepatitis     Hepatitis B--treated    Migraine     Miscarriage     Syphilis     patient is unsure of this, same incident as what she thought was herpes       Past Surgical History:   Procedure Laterality Date     SECTION      COLONOSCOPY      2017-wnl repeat 10 years    FINGER SURGERY      MOUTH SURGERY      tooth implant and some extractions    WISDOM TOOTH EXTRACTION         Family History   Problem Relation Age of Onset    Hyperlipidemia Mother     Osteoporosis Mother     Hypertension Father     Cancer Father 80         2023, tonsil cancer    Thyroid disease Father         Thyroid nodules,  Dec 2023    No Known Problems  Sister     Substance Abuse Daughter     Mental illness Daughter     No Known Problems Maternal Grandmother     No Known Problems Maternal Grandfather     No Known Problems Paternal Grandmother     No Known Problems Paternal Grandfather     Hyperlipidemia Brother     No Known Problems Brother     No Known Problems Brother     Other Brother         meningiocele    No Known Problems Brother     Other Brother         missing in [place]    Mental illness Son     No Known Problems Maternal Aunt     No Known Problems Maternal Aunt     Breast cancer Paternal Aunt 60        fathers half sister; ? gyn ca as well?    No Known Problems Paternal Aunt     No Known Problems Paternal Aunt     Ovarian cancer Neg Hx     Colon cancer Neg Hx     Alcohol abuse Neg Hx     Uterine cancer Neg Hx          Medications have been verified.        Objective   /70 (BP Location: Right arm, Patient Position: Sitting)   Pulse 64   Temp 98.6 °F (37 °C) (Tympanic)   Resp 18   Wt 55.3 kg (122 lb)   LMP 12/23/2020 (Exact Date)   SpO2 99%   BMI 23.83 kg/m²   Patient's last menstrual period was 12/23/2020 (exact date).       Physical Exam     Physical Exam  Vitals and nursing note reviewed.   Constitutional:       General: She is not in acute distress.     Appearance: Normal appearance. She is normal weight. She is not ill-appearing, toxic-appearing or diaphoretic.   HENT:      Head: Normocephalic and atraumatic.      Right Ear: External ear normal.      Left Ear: External ear normal.      Nose: Nose normal.      Mouth/Throat:      Mouth: Mucous membranes are moist.      Pharynx: No oropharyngeal exudate or posterior oropharyngeal erythema.      Comments: No oropharyngeal swelling edema noted.  No stridor noted.  Eyes:      General: No scleral icterus.        Right eye: No discharge.         Left eye: No discharge.      Extraocular Movements: Extraocular movements intact.      Pupils: Pupils are equal, round, and reactive to light.    Cardiovascular:      Rate and Rhythm: Normal rate and regular rhythm.      Pulses: Normal pulses.           Carotid pulses are 2+ on the right side and 2+ on the left side.       Radial pulses are 2+ on the right side and 2+ on the left side.      Heart sounds: No murmur heard.     No friction rub. No gallop.   Pulmonary:      Effort: Pulmonary effort is normal. No respiratory distress.      Breath sounds: Normal breath sounds. No stridor. No wheezing, rhonchi or rales.   Chest:      Chest wall: No tenderness.   Abdominal:      General: Abdomen is flat. There is no distension.      Palpations: Abdomen is soft. There is no mass.      Tenderness: There is no abdominal tenderness. There is no right CVA tenderness, left CVA tenderness, guarding or rebound.      Hernia: No hernia is present.   Musculoskeletal:         General: No swelling, tenderness, deformity or signs of injury.      Cervical back: Normal range of motion and neck supple.      Right lower leg: No edema.      Left lower leg: No edema.   Skin:     General: Skin is warm and dry.      Capillary Refill: Capillary refill takes less than 2 seconds.      Findings: Rash present.      Comments: Scattered, macular vesicular rash noted over bilateral upper extremities   Neurological:      General: No focal deficit present.      Mental Status: She is alert and oriented to person, place, and time.      Cranial Nerves: No cranial nerve deficit.      Sensory: No sensory deficit.      Motor: No weakness.      Coordination: Coordination normal.      Gait: Gait normal.   Psychiatric:         Mood and Affect: Mood normal.         Behavior: Behavior normal.

## 2024-08-22 ENCOUNTER — OFFICE VISIT (OUTPATIENT)
Dept: PHYSICAL THERAPY | Facility: CLINIC | Age: 59
End: 2024-08-22
Payer: COMMERCIAL

## 2024-08-22 DIAGNOSIS — M25.532 LEFT WRIST PAIN: Primary | ICD-10-CM

## 2024-08-22 PROCEDURE — 97110 THERAPEUTIC EXERCISES: CPT | Performed by: PHYSICAL THERAPIST

## 2024-08-22 PROCEDURE — 97161 PT EVAL LOW COMPLEX 20 MIN: CPT | Performed by: PHYSICAL THERAPIST

## 2024-08-22 NOTE — PROGRESS NOTES
PT Evaluation     Today's date: 2024  Patient name: Romero Guerin  : 1965  MRN: [id number]  Referring provider: Maddie Lake, PT  Dx:   Encounter Diagnosis     ICD-10-CM    1. Left wrist pain  M25.532                      Assessment  Functional limitations: pain lifting/twisting/carrying/typing, pain with yoga, some pain with dressing    Assessment details: Romero Guerin is a 57 yo RHD female presenting with complaint of left ulnar sided wrist pain for the last 3 months with no mechanism of injury. She is currently feeling much better, however, is on prednisone for an unrelated issue. She presents with normal ROM but significantly decreased  strength and pain with function. She is a good candidate for OPPT to address above impairments and optimize functional status.     Goals  4-6 weeks  1. Independent with HEP at discharge  2. Increase  strength of L hand to equal at least 75% of R hand  3. Tolerate pain free ADLs with L hand to promote return to PLOF  4. Tolerate participating in yoga positions pain free to promote return to PLOF    Plan  Patient would benefit from: PT eval and skilled physical therapy  Planned modality interventions: low level laser therapy    Planned therapy interventions: manual therapy, neuromuscular re-education, patient/caregiver education, therapeutic activities, therapeutic exercise and home exercise program    Frequency: 2x week  Duration in weeks: 6  Treatment plan discussed with: patient  Plan details: Provided with and reviewed initial written HEP.  Reviewed physical exam findings and plan of care.  All questions answered to patient's satisfaction.        Subjective Evaluation    History of Present Illness  Mechanism of injury: Patient reports left wrist pain for the last few months with no mechanism of injury. Pain with extension. Burning sensation at hypothenar eminence. She is feeling well currently, but is on prednisone for a skin rash. She arrives via direct  access for OPPT evaluation.     History of left trigger finger with 3 injections appx 2 years ago  Patient Goals  Patient goals for therapy: decreased pain, increased strength and independence with ADLs/IADLs    Pain  Current pain ratin  At best pain ratin  At worst pain ratin  Location: L wrist    Hand dominance: right      Diagnostic Tests  No diagnostic tests performed  Treatments  No previous or current treatments      Objective     Tenderness     Left Wrist/Hand   Tenderness in the distal radioulnar joint.     Active Range of Motion     Left Wrist   Normal active range of motion  Wrist extension: with pain    Strength/Myotome Testing     Left Wrist/Hand   Wrist extension: 4 (pain)  Wrist flexion: 5  Radial deviation: 5  Ulnar deviation: 4 (pain)     (2nd hand position)     Trial 1: 5    Comments: pain    Right Wrist/Hand      (2nd hand position)     Trial 1: 20           Precautions: standard      Manuals                                                                 Neuro Re-Ed                                                                                                        Ther Ex             Gripper             Towel sq             Iso wrist ext, RD             Therabar ext                                                    Pt ed HEP rev KT            Ther Activity                                       Gait Training                                       Modalities

## 2024-09-03 ENCOUNTER — OFFICE VISIT (OUTPATIENT)
Dept: PHYSICAL THERAPY | Facility: CLINIC | Age: 59
End: 2024-09-03
Payer: COMMERCIAL

## 2024-09-03 DIAGNOSIS — M25.532 LEFT WRIST PAIN: Primary | ICD-10-CM

## 2024-09-03 PROCEDURE — 97140 MANUAL THERAPY 1/> REGIONS: CPT | Performed by: PHYSICAL THERAPIST

## 2024-09-03 PROCEDURE — 97110 THERAPEUTIC EXERCISES: CPT | Performed by: PHYSICAL THERAPIST

## 2024-09-06 ENCOUNTER — OFFICE VISIT (OUTPATIENT)
Dept: PHYSICAL THERAPY | Facility: CLINIC | Age: 59
End: 2024-09-06
Payer: COMMERCIAL

## 2024-09-06 DIAGNOSIS — M25.532 LEFT WRIST PAIN: Primary | ICD-10-CM

## 2024-09-06 PROCEDURE — 97140 MANUAL THERAPY 1/> REGIONS: CPT | Performed by: PHYSICAL THERAPIST

## 2024-09-06 PROCEDURE — 97110 THERAPEUTIC EXERCISES: CPT | Performed by: PHYSICAL THERAPIST

## 2024-09-06 NOTE — PROGRESS NOTES
"Daily Note     Today's date: 2024  Patient name: Romero Guerin  : 1965  MRN: [id number]  Referring provider: Maddie Lake, PT  Dx:   Encounter Diagnosis     ICD-10-CM    1. Left wrist pain  M25.532                      Subjective: Pt reports feeling better. Less pain than prior to starting PT.       Objective: See treatment diary below      Assessment: Tolerated treatment well. Patient exhibited good technique with therapeutic exercises and would benefit from continued PT      Plan: Continue per plan of care.      Precautions: standard      Manuals 8/22 9/3 9/6          Laser TFCC  16W 4' 16W 4'          STM TFCC  KT KT                                    Neuro Re-Ed             [place] lid circles cw, ccw   20x ea          [place] tennis ball bounce   2x10                                                                           Ther Ex             Gripper  5\"x20 5\"x20          Towel sq  5\"x20 5\"x20          Iso wrist ext, RD, UD  10\"x10 ea 10\"x10 ea          Therabar ext  2x10 yellow 2x10 yellow                                                 Pt ed HEP rev KT            Ther Activity                                       Gait Training                                       Modalities                                              "

## 2024-09-09 ENCOUNTER — APPOINTMENT (OUTPATIENT)
Dept: PHYSICAL THERAPY | Facility: CLINIC | Age: 59
End: 2024-09-09
Payer: COMMERCIAL

## 2024-09-12 ENCOUNTER — APPOINTMENT (OUTPATIENT)
Dept: PHYSICAL THERAPY | Facility: CLINIC | Age: 59
End: 2024-09-12
Payer: COMMERCIAL

## 2024-09-16 ENCOUNTER — APPOINTMENT (OUTPATIENT)
Dept: PHYSICAL THERAPY | Facility: CLINIC | Age: 59
End: 2024-09-16
Payer: COMMERCIAL

## 2024-09-19 ENCOUNTER — APPOINTMENT (OUTPATIENT)
Dept: PHYSICAL THERAPY | Facility: CLINIC | Age: 59
End: 2024-09-19
Payer: COMMERCIAL

## 2024-09-23 ENCOUNTER — APPOINTMENT (OUTPATIENT)
Dept: PHYSICAL THERAPY | Facility: CLINIC | Age: 59
End: 2024-09-23
Payer: COMMERCIAL

## 2024-09-24 DIAGNOSIS — B00.1 COLD SORE: ICD-10-CM

## 2024-09-24 RX ORDER — VALACYCLOVIR HYDROCHLORIDE 1 G/1
1000 TABLET, FILM COATED ORAL 2 TIMES DAILY
Qty: 10 TABLET | Refills: 3 | Status: SHIPPED | OUTPATIENT
Start: 2024-09-24 | End: 2024-09-25

## 2024-09-24 NOTE — TELEPHONE ENCOUNTER
Reason for call: Dr : Hernesto Walter, was who prescribed the first time, no longer there, Patient is seeing    Estefany Ramirez PA-C (cold sore)     [x] Refill   [] Prior Auth  [] Other:     Office:   [x] PCP/Provider -   [] Specialty/Provider -     Medication: valACYclovir (VALTREX) 1,000 mg tablet      Dose/Frequency: Take 1 tablet (1,000 mg total) by mouth 2 (two) times a day for 1 day     Quantity: 10 tablet     Pharmacy: [facility]/pharm[facility] #[id number] - [place] - [address] [address] [phone]     Does the patient have enough for 3 days?   [] Yes   [x] No - Send as HP to POD

## 2024-09-26 ENCOUNTER — APPOINTMENT (OUTPATIENT)
Dept: PHYSICAL THERAPY | Facility: CLINIC | Age: 59
End: 2024-09-26
Payer: COMMERCIAL

## 2024-11-05 ENCOUNTER — OFFICE VISIT (OUTPATIENT)
Dept: FAMILY MEDICINE CLINIC | Facility: CLINIC | Age: 59
End: 2024-11-05
Payer: COMMERCIAL

## 2024-11-05 VITALS
TEMPERATURE: 97.5 F | DIASTOLIC BLOOD PRESSURE: 84 MMHG | OXYGEN SATURATION: 98 % | SYSTOLIC BLOOD PRESSURE: 122 MMHG | WEIGHT: 122.8 LBS | HEIGHT: 60 IN | HEART RATE: 72 BPM | RESPIRATION RATE: 18 BRPM | BODY MASS INDEX: 24.11 KG/M2

## 2024-11-05 DIAGNOSIS — L23.7 ALLERGIC CONTACT DERMATITIS DUE TO PLANTS, EXCEPT FOOD: Primary | ICD-10-CM

## 2024-11-05 PROCEDURE — 99213 OFFICE O/P EST LOW 20 MIN: CPT | Performed by: PHYSICIAN ASSISTANT

## 2024-11-05 RX ORDER — PREDNISONE 20 MG/1
40 TABLET ORAL DAILY
Qty: 10 TABLET | Refills: 0 | Status: SHIPPED | OUTPATIENT
Start: 2024-11-05 | End: 2024-11-10

## 2024-11-05 NOTE — PROGRESS NOTES
Assessment/Plan:    Contact dermatitis - prednisone 40 mg once daily for 5 days, can continue hydrocortisone as needed, allegra and claritin     F/u as needed    Subjective:   Chief Complaint   Patient presents with    Rash     R inner elbow   Itch around the rash   Burning   Used OTC w/ little relief   Started last       Patient ID: Romero Guerin is a 58 y.o. female.    Patient was in the garden on  a week ago,  started with an itchy rash on right arm. Tried menthol cream to cool can calm which helps but not getting better. Alos done cortisone cream, allegra and claritin daily. Continues to swell and itch a week later.        The following portions of the patient's history were reviewed and updated as appropriate: allergies, current medications, past family history, past medical history, past social history, past surgical history, and problem list.    Past Medical History:   Diagnosis Date    Allergic     GERD (gastroesophageal reflux disease)     Gonorrhea     Headache(784.0)     Hepatitis B     Herpes     PERIORAL    Infectious viral hepatitis     Hepatitis B--treated    Migraine     Miscarriage     Syphilis     patient is unsure of this, same incident as what she thought was herpes     Past Surgical History:   Procedure Laterality Date     SECTION      COLONOSCOPY      2017-wnl repeat 10 years    FINGER SURGERY      MOUTH SURGERY      tooth implant and some extractions    WISDOM TOOTH EXTRACTION       Family History   Problem Relation Age of Onset    Hyperlipidemia Mother     Osteoporosis Mother     Dementia Mother     Anxiety disorder Mother     Hypertension Father     Cancer Father 80        Throat cancer    Thyroid disease Father         Thyroid nodules,  Dec 2023    Arthritis Father     No Known Problems Sister     Substance Abuse Daughter     Mental illness Daughter     No Known Problems Maternal Grandmother     No Known Problems Maternal  Grandfather     No Known Problems Paternal Grandmother     No Known Problems Paternal Grandfather     Hyperlipidemia Brother     No Known Problems Brother     No Known Problems Brother     Other Brother         meningiocele    No Known Problems Brother     Other Brother         missing in [place]    Mental illness Son     No Known Problems Maternal Aunt     No Known Problems Maternal Aunt     Breast cancer Paternal Aunt 60        fathers half sister; ? gyn ca as well?    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[employer]- [employer]   Tobacco Use    Smoking status: Never    Smokeless tobacco: Never   Vaping Use    Vaping status: Never Used   Substance and Sexual Activity    Alcohol use: Yes     Comment: Rarely-once or twice a year    Drug use: No    Sexual activity: Not Currently     Partners: Male     Birth control/protection: None     Comment: Lifetime partners: 2;  1 1/2 yrs   Other Topics Concern    Not on file   Social History Narrative    Presybeterian: no preference    Accepts blood products        Exercise: 2-3x/week    Calcium: 4 oz milk daily, 1 cheese a few times/week, 1 yogurt 2-3x.week, 1 multivitamin daily (for women >50)     Social Determinants of Health     Financial Resource Strain: Low Risk  (12/28/2020)    Overall Financial Resource Strain (CARDIA)     Difficulty of Paying Living Expenses: Not hard at all   Food Insecurity: No Food Insecurity (12/28/2020)    Hunger Vital Sign     Worried About Running Out of Food in the Last Year: Never true     Ran Out of Food in the Last Year: Never true   Transportation Needs: No  Transportation Needs (12/28/2020)    PRAPARE - Transportation     Lack of Transportation (Medical): No     Lack of Transportation (Non-Medical): No   Physical Activity: Insufficiently Active (3/25/2019)    Exercise Vital Sign     Days of Exercise per Week: 2 days     Minutes of Exercise per Session: 30 min   Stress: No Stress Concern Present (3/25/2019)    [employer] [place] [employer] - Occupational Stress Questionnaire     Feeling of Stress : Not at all   Social Connections: Moderately Isolated (3/25/2019)    Social Connection and Isolation Panel [NHANES]     Frequency of Communication with Friends and Family: Twice a week     Frequency of Social Gatherings with Friends and Family: Twice a week     Attends Judaism Services: Never     Active Member of Clubs or Organizations: No     Attends Club or Organization Meetings: Never     Marital Status:    Intimate Partner Violence: Not At Risk (3/25/2019)    Humiliation, Afraid, Rape, and Kick questionnaire     Fear of Current or Ex-Partner: No     Emotionally Abused: No     Physically Abused: No     Sexually Abused: No   Housing Stability: Not on file       Current Outpatient Medications:     Biotin 1000 MCG tablet, Take 1,000 mcg by mouth daily , Disp: , Rfl:     calcium carbonate (Calcium 600) 600 MG tablet, , Disp: , Rfl:     fexofenadine (ALLEGRA) 180 MG tablet, Take 180 mg by mouth if needed, Disp: , Rfl:     fluticasone (FLONASE) 50 mcg/act nasal spray, 1 spray into each nostril if needed, Disp: , Rfl:     loratadine (CLARITIN) 10 mg tablet, Take 10 mg by mouth if needed, Disp: , Rfl:     magnesium oxide-pyridoxine (BEELITH) 362-20 MG TABS, Take 1 tablet by mouth daily, Disp: , Rfl:     meloxicam (Mobic) 15 mg tablet, Take 1 tablet (15 mg total) by mouth daily (Patient taking differently: Take 15 mg by mouth if needed), Disp: 30 tablet, Rfl: 0    Multiple Vitamin (MULTIVITAMIN) capsule, Take 1 capsule by mouth daily, Disp: , Rfl:     ondansetron  (Zofran ODT) 4 mg disintegrating tablet, Take 1 tablet (4 mg total) by mouth every 6 (six) hours as needed for nausea or vomiting, Disp: 20 tablet, Rfl: 1    ibuprofen (MOTRIN) 200 mg tablet, Take by mouth every 6 (six) hours as needed for mild pain (Patient not taking: Reported on 11/5/2024), Disp: , Rfl:     meclizine (ANTIVERT) 12.5 MG tablet, Take by mouth 3 (three) times a day as needed for dizziness (Patient not taking: Reported on 11/5/2024), Disp: , Rfl:     methocarbamol (ROBAXIN) 750 mg tablet, Take 1 tablet (750 mg total) by mouth every 6 (six) hours as needed for muscle spasms (Patient not taking: Reported on 11/5/2024), Disp: 30 tablet, Rfl: 0    valACYclovir (VALTREX) 1,000 mg tablet, Take 1 tablet (1,000 mg total) by mouth 2 (two) times a day for 1 day, Disp: 10 tablet, Rfl: 3    Review of Systems          Objective:    Vitals:    11/05/24 0858   BP: 122/84   Pulse: 72   Resp: 18   Temp: 97.5 °F (36.4 °C)   SpO2: 98%   Weight: 55.7 kg (122 lb 12.8 oz)   Height: 5' (1.524 m)        Physical Exam  Constitutional:       Appearance: Normal appearance.   HENT:      Head: Normocephalic and atraumatic.   Skin:     Comments: Right mid arm with 5 cm x 4 cm area of soft tissue swelling, pink in color, center area with 3 separate macular scaly patches   Neurological:      General: No focal deficit present.      Mental Status: She is alert and oriented to person, place, and time.   Psychiatric:         Mood and Affect: Mood normal.         Behavior: Behavior normal.         Thought Content: Thought content normal.         Judgment: Judgment normal.

## 2024-12-02 DIAGNOSIS — G43.109 MIGRAINE WITH VERTIGO: ICD-10-CM

## 2024-12-03 RX ORDER — ONDANSETRON 4 MG/1
4 TABLET, ORALLY DISINTEGRATING ORAL EVERY 6 HOURS PRN
Qty: 20 TABLET | Refills: 1 | Status: SHIPPED | OUTPATIENT
Start: 2024-12-03

## 2025-02-11 ENCOUNTER — OFFICE VISIT (OUTPATIENT)
Dept: FAMILY MEDICINE CLINIC | Facility: CLINIC | Age: 60
End: 2025-02-11
Payer: COMMERCIAL

## 2025-02-11 VITALS
TEMPERATURE: 97.3 F | RESPIRATION RATE: 16 BRPM | WEIGHT: 127 LBS | BODY MASS INDEX: 24.94 KG/M2 | HEART RATE: 64 BPM | HEIGHT: 60 IN | SYSTOLIC BLOOD PRESSURE: 104 MMHG | OXYGEN SATURATION: 98 % | DIASTOLIC BLOOD PRESSURE: 68 MMHG

## 2025-02-11 DIAGNOSIS — R42 DIZZY SPELLS: ICD-10-CM

## 2025-02-11 DIAGNOSIS — R11.0 NAUSEA: Primary | ICD-10-CM

## 2025-02-11 DIAGNOSIS — R53.83 FATIGUE, UNSPECIFIED TYPE: ICD-10-CM

## 2025-02-11 DIAGNOSIS — Z12.31 ENCOUNTER FOR SCREENING MAMMOGRAM FOR BREAST CANCER: ICD-10-CM

## 2025-02-11 DIAGNOSIS — S16.1XXA STRAIN OF NECK MUSCLE, INITIAL ENCOUNTER: ICD-10-CM

## 2025-02-11 PROCEDURE — 93000 ELECTROCARDIOGRAM COMPLETE: CPT | Performed by: PHYSICIAN ASSISTANT

## 2025-02-11 PROCEDURE — 99214 OFFICE O/P EST MOD 30 MIN: CPT | Performed by: PHYSICIAN ASSISTANT

## 2025-02-11 NOTE — PROGRESS NOTES
Name: Romero Guerin      : 1965      MRN: [id number]  Encounter Provider: Estefany Ramirez PA-C  Encounter Date: 2025   Encounter department: [facility]  :  Assessment & Plan  Dizzy spells    Described more as woozy sensation, transient, not with position change, usually while standing, brief. EKG NSR, normal exam, will start with labs.     Nausea    Will start with labs  Orders:    Comprehensive metabolic panel; Future    Lipase; Future    Fatigue, unspecified type    Will start with labs, possibly stress from mother living with her  Orders:    CBC and differential; Future    TSH, 3rd generation with Free T4 reflex; Future    Comprehensive metabolic panel; Future    Vitamin D 25 hydroxy; Future    Vitamin B12; Future    Strain of neck muscle, initial encounter    Refer to chiropractor per patient request  Orders:    Ambulatory Referral to Chiropractic; Future    Encounter for screening mammogram for breast cancer    Orders:    Mammo screening bilateral w 3d and cad; Future    F/u as needed and as scheduled         History of Present Illness   Patient here complaing of not feeling well. Has been having lightheaded, nausea. No vomiting. Like motion sickness. Feeling woozy. Neck aches, better with topical oils. Heart palpitations comes and goes so quickly, does not cause dizzy so does not worry about it. Can walk at a sustained HR of 170 bpm. Has to force self to do things, other wise just wants to sleep all day.    Under stress from mom, listens for her at night, her moms movement at night wakes her up. Patient is concern about her falling.    Denies room spinning sensation.    Appetite normal, moving bowels normally, urinating normally. No other complaints. Just feels off past two weeks.       Review of Systems    Objective   /68   Pulse 64   Temp (!) 97.3 °F (36.3 °C) (Temporal)   Resp 16   Ht 5' (1.524 m)   Wt 57.6 kg (127 lb)   LMP 2020  (Exact Date)   SpO2 98%   BMI 24.80 kg/m²      Physical Exam  Constitutional:       Appearance: Normal appearance. She is well-developed and normal weight.   HENT:      Head: Normocephalic and atraumatic.      Right Ear: Hearing, tympanic membrane, ear canal and external ear normal.      Left Ear: Hearing, tympanic membrane, ear canal and external ear normal.      Nose: Nose normal.      Mouth/Throat:      Mouth: Mucous membranes are moist.      Pharynx: Oropharynx is clear. Uvula midline.   Eyes:      Extraocular Movements: Extraocular movements intact.      Conjunctiva/sclera: Conjunctivae normal.      Pupils: Pupils are equal, round, and reactive to light.   Neck:      Thyroid: No thyromegaly.   Cardiovascular:      Rate and Rhythm: Normal rate and regular rhythm.      Pulses: Normal pulses.      Heart sounds: Normal heart sounds. No murmur heard.  Pulmonary:      Effort: Pulmonary effort is normal.      Breath sounds: Normal breath sounds.   Abdominal:      General: Abdomen is flat. Bowel sounds are normal. There is no distension.      Palpations: Abdomen is soft. There is no mass.      Tenderness: There is no abdominal tenderness.   Musculoskeletal:         General: Normal range of motion.      Cervical back: Normal range of motion and neck supple.   Lymphadenopathy:      Cervical: No cervical adenopathy.   Skin:     General: Skin is warm.   Neurological:      General: No focal deficit present.      Mental Status: She is alert and oriented to person, place, and time.      Cranial Nerves: No cranial nerve deficit.      Deep Tendon Reflexes: Reflexes normal.      Comments: Cannot reproduce dizzy spells   Psychiatric:         Mood and Affect: Mood normal.         Behavior: Behavior normal.         Thought Content: Thought content normal.         Judgment: Judgment normal.     I have spent a total time of 32 minutes in caring for this patient on the day of the visit/encounter including Impressions, Documenting in  the medical record, Reviewing / ordering tests, medicine, procedures  , and Obtaining or reviewing history  .

## 2025-02-13 DIAGNOSIS — Z13.1 SCREENING FOR DIABETES MELLITUS: Primary | ICD-10-CM

## 2025-02-18 ENCOUNTER — APPOINTMENT (OUTPATIENT)
Dept: LAB | Facility: HOSPITAL | Age: 60
End: 2025-02-18
Payer: COMMERCIAL

## 2025-02-18 DIAGNOSIS — R53.83 FATIGUE, UNSPECIFIED TYPE: ICD-10-CM

## 2025-02-18 DIAGNOSIS — R11.0 NAUSEA: ICD-10-CM

## 2025-02-18 LAB
25(OH)D3 SERPL-MCNC: 37.6 NG/ML (ref 30–100)
ALBUMIN SERPL BCG-MCNC: 4.6 G/DL (ref 3.5–5)
ALP SERPL-CCNC: 68 U/L (ref 34–104)
ALT SERPL W P-5'-P-CCNC: 14 U/L (ref 7–52)
ANION GAP SERPL CALCULATED.3IONS-SCNC: 8 MMOL/L (ref 4–13)
AST SERPL W P-5'-P-CCNC: 18 U/L (ref 13–39)
BASOPHILS # BLD AUTO: 0.02 THOUSANDS/ΜL (ref 0–0.1)
BASOPHILS NFR BLD AUTO: 0 % (ref 0–1)
BILIRUB SERPL-MCNC: 0.51 MG/DL (ref 0.2–1)
BUN SERPL-MCNC: 17 MG/DL (ref 5–25)
CALCIUM SERPL-MCNC: 9.3 MG/DL (ref 8.4–10.2)
CHLORIDE SERPL-SCNC: 104 MMOL/L (ref 96–108)
CO2 SERPL-SCNC: 29 MMOL/L (ref 21–32)
CREAT SERPL-MCNC: 0.69 MG/DL (ref 0.6–1.3)
EOSINOPHIL # BLD AUTO: 0.14 THOUSAND/ΜL (ref 0–0.61)
EOSINOPHIL NFR BLD AUTO: 3 % (ref 0–6)
ERYTHROCYTE [DISTWIDTH] IN BLOOD BY AUTOMATED COUNT: 13.6 % (ref 11.6–15.1)
EST. AVERAGE GLUCOSE BLD GHB EST-MCNC: 128 MG/DL
GFR SERPL CREATININE-BSD FRML MDRD: 96 ML/MIN/1.73SQ M
GLUCOSE P FAST SERPL-MCNC: 89 MG/DL (ref 65–99)
HBA1C MFR BLD: 6.1 %
HCT VFR BLD AUTO: 43.1 % (ref 34.8–46.1)
HGB BLD-MCNC: 13.7 G/DL (ref 11.5–15.4)
IMM GRANULOCYTES # BLD AUTO: 0.01 THOUSAND/UL (ref 0–0.2)
IMM GRANULOCYTES NFR BLD AUTO: 0 % (ref 0–2)
LIPASE SERPL-CCNC: 29 U/L (ref 11–82)
LYMPHOCYTES # BLD AUTO: 2.13 THOUSANDS/ΜL (ref 0.6–4.47)
LYMPHOCYTES NFR BLD AUTO: 38 % (ref 14–44)
MCH RBC QN AUTO: 27.9 PG (ref 26.8–34.3)
MCHC RBC AUTO-ENTMCNC: 31.8 G/DL (ref 31.4–37.4)
MCV RBC AUTO: 88 FL (ref 82–98)
MONOCYTES # BLD AUTO: 0.44 THOUSAND/ΜL (ref 0.17–1.22)
MONOCYTES NFR BLD AUTO: 8 % (ref 4–12)
NEUTROPHILS # BLD AUTO: 2.94 THOUSANDS/ΜL (ref 1.85–7.62)
NEUTS SEG NFR BLD AUTO: 51 % (ref 43–75)
NRBC BLD AUTO-RTO: 0 /100 WBCS
PLATELET # BLD AUTO: 217 THOUSANDS/UL (ref 149–390)
PMV BLD AUTO: 9.7 FL (ref 8.9–12.7)
POTASSIUM SERPL-SCNC: 4.4 MMOL/L (ref 3.5–5.3)
PROT SERPL-MCNC: 7.3 G/DL (ref 6.4–8.4)
RBC # BLD AUTO: 4.91 MILLION/UL (ref 3.81–5.12)
SODIUM SERPL-SCNC: 141 MMOL/L (ref 135–147)
TSH SERPL DL<=0.05 MIU/L-ACNC: 0.68 UIU/ML (ref 0.45–4.5)
VIT B12 SERPL-MCNC: 619 PG/ML (ref 180–914)
WBC # BLD AUTO: 5.68 THOUSAND/UL (ref 4.31–10.16)

## 2025-02-18 PROCEDURE — 83690 ASSAY OF LIPASE: CPT

## 2025-02-18 PROCEDURE — 85025 COMPLETE CBC W/AUTO DIFF WBC: CPT

## 2025-02-18 PROCEDURE — 36415 COLL VENOUS BLD VENIPUNCTURE: CPT

## 2025-02-18 PROCEDURE — 82306 VITAMIN D 25 HYDROXY: CPT

## 2025-02-18 PROCEDURE — 84443 ASSAY THYROID STIM HORMONE: CPT

## 2025-02-18 PROCEDURE — 80053 COMPREHEN METABOLIC PANEL: CPT

## 2025-02-18 PROCEDURE — 83036 HEMOGLOBIN GLYCOSYLATED A1C: CPT | Performed by: PHYSICIAN ASSISTANT

## 2025-02-18 PROCEDURE — 82607 VITAMIN B-12: CPT

## 2025-02-21 ENCOUNTER — RESULTS FOLLOW-UP (OUTPATIENT)
Dept: FAMILY MEDICINE CLINIC | Facility: CLINIC | Age: 60
End: 2025-02-21

## 2025-02-27 ENCOUNTER — OFFICE VISIT (OUTPATIENT)
Dept: URGENT CARE | Facility: CLINIC | Age: 60
End: 2025-02-27
Payer: COMMERCIAL

## 2025-02-27 VITALS
BODY MASS INDEX: 24.81 KG/M2 | WEIGHT: 126.4 LBS | OXYGEN SATURATION: 97 % | HEIGHT: 60 IN | DIASTOLIC BLOOD PRESSURE: 60 MMHG | TEMPERATURE: 100.9 F | SYSTOLIC BLOOD PRESSURE: 102 MMHG | RESPIRATION RATE: 16 BRPM | HEART RATE: 89 BPM

## 2025-02-27 DIAGNOSIS — R68.89 FLU-LIKE SYMPTOMS: ICD-10-CM

## 2025-02-27 DIAGNOSIS — J06.9 UPPER RESPIRATORY TRACT INFECTION, UNSPECIFIED TYPE: Primary | ICD-10-CM

## 2025-02-27 PROCEDURE — 87636 SARSCOV2 & INF A&B AMP PRB: CPT | Performed by: NURSE PRACTITIONER

## 2025-02-27 PROCEDURE — 99213 OFFICE O/P EST LOW 20 MIN: CPT | Performed by: NURSE PRACTITIONER

## 2025-02-27 RX ORDER — OSELTAMIVIR PHOSPHATE 75 MG/1
75 CAPSULE ORAL EVERY 12 HOURS SCHEDULED
Qty: 10 CAPSULE | Refills: 0 | Status: SHIPPED | OUTPATIENT
Start: 2025-02-27 | End: 2025-03-04

## 2025-02-27 RX ORDER — BENZONATATE 200 MG/1
200 CAPSULE ORAL 3 TIMES DAILY PRN
Qty: 20 CAPSULE | Refills: 0 | Status: SHIPPED | OUTPATIENT
Start: 2025-02-27

## 2025-02-27 NOTE — PROGRESS NOTES
[facility]        NAME: Romero Guerin is a 59 y.o. female  : 1965    MRN: [id number]  DATE: 2025  TIME: 4:14 PM    Assessment and Plan   Upper respiratory tract infection, unspecified type [J06.9]  1. Upper respiratory tract infection, unspecified type        2. Flu-like symptoms  Covid/Flu- Office Collect Normal    oseltamivir (TAMIFLU) 75 mg capsule    benzonatate (TESSALON) 200 MG capsule            Patient Instructions     Take med as prescribed  Likely influenza   Follow up with PCP in 3-5 days.  Proceed to  ER if symptoms worsen.    If tests have been performed at [facility], our office will contact you with results if changes need to be made to the care plan discussed with you at the visit.  [facility]    Chief Complaint     Chief Complaint   Patient presents with    Cold Like Symptoms     Started on Tuesday with body aches, cough, sore throat, chills, fever tmax: 102.2, was in contact with someone who has the flu this week, concerned for Covid/flu, and she's been taking Tylenol/ibuprofen for relief          History of Present Illness       HPI  Reports coughing, bodyaches, chills, appetite, fever and sore throat. Some started yesterday, and fever started today. Was 102.2 degrees a couple of hrs ago. Bodyaches is worse today, compared to yesterday.     Review of Systems   Review of Systems   Constitutional:  Positive for appetite change, chills, fatigue and fever.   HENT:  Positive for congestion, rhinorrhea and sore throat. Negative for ear pain.    Respiratory:  Positive for cough. Negative for shortness of breath and wheezing.    Cardiovascular:  Negative for chest pain.   Gastrointestinal:  Negative for diarrhea and vomiting.   Musculoskeletal:  Positive for myalgias.   Neurological:  Negative for light-headedness.         Current Medications       Current Outpatient Medications:     benzonatate (TESSALON) 200 MG capsule, Take 1  capsule (200 mg total) by mouth 3 (three) times a day as needed for cough, Disp: 20 capsule, Rfl: 0    oseltamivir (TAMIFLU) 75 mg capsule, Take 1 capsule (75 mg total) by mouth every 12 (twelve) hours for 5 days, Disp: 10 capsule, Rfl: 0    Biotin 1000 MCG tablet, Take 1,000 mcg by mouth daily , Disp: , Rfl:     calcium carbonate (Calcium 600) 600 MG tablet, , Disp: , Rfl:     fexofenadine (ALLEGRA) 180 MG tablet, Take 180 mg by mouth if needed, Disp: , Rfl:     fluticasone (FLONASE) 50 mcg/act nasal spray, 1 spray into each nostril if needed, Disp: , Rfl:     ibuprofen (MOTRIN) 200 mg tablet, Take by mouth every 6 (six) hours as needed for mild pain, Disp: , Rfl:     loratadine (CLARITIN) 10 mg tablet, Take 10 mg by mouth if needed, Disp: , Rfl:     magnesium oxide-pyridoxine (BEELITH) 362-20 MG TABS, Take 1 tablet by mouth daily, Disp: , Rfl:     meclizine (ANTIVERT) 12.5 MG tablet, Take by mouth 3 (three) times a day as needed for dizziness (Patient not taking: Reported on 11/5/2024), Disp: , Rfl:     meloxicam (Mobic) 15 mg tablet, Take 1 tablet (15 mg total) by mouth daily, Disp: 30 tablet, Rfl: 0    methocarbamol (ROBAXIN) 750 mg tablet, Take 1 tablet (750 mg total) by mouth every 6 (six) hours as needed for muscle spasms, Disp: 30 tablet, Rfl: 0    Multiple Vitamin (MULTIVITAMIN) capsule, Take 1 capsule by mouth daily, Disp: , Rfl:     ondansetron (Zofran ODT) 4 mg disintegrating tablet, Take 1 tablet (4 mg total) by mouth every 6 (six) hours as needed for nausea or vomiting, Disp: 20 tablet, Rfl: 1    Turmeric (QC TUMERIC COMPLEX PO), Take by mouth, Disp: , Rfl:     valACYclovir (VALTREX) 1,000 mg tablet, Take 1 tablet (1,000 mg total) by mouth 2 (two) times a day for 1 day, Disp: 10 tablet, Rfl: 3    Current Allergies     Allergies as of 02/27/2025 - Reviewed 02/27/2025   Allergen Reaction Noted    Sulfamethoxazole-trimethoprim Hives, Itching, and Rash 01/28/2019            The following portions of the  patient's history were reviewed and updated as appropriate: allergies, current medications, past family history, past medical history, past social history, past surgical history and problem list.     Past Medical History:   Diagnosis Date    Allergic     GERD (gastroesophageal reflux disease)     Gonorrhea 1987    Headache(784.0)     Hepatitis B 1989    Herpes 1987    PERIORAL    Infectious viral hepatitis     Hepatitis B--treated    Migraine     Miscarriage     Syphilis     patient is unsure of this, same incident as what she thought was herpes       Past Surgical History:   Procedure Laterality Date     SECTION      COLONOSCOPY      2017-wnl repeat 10 years    FINGER SURGERY      MOUTH SURGERY      tooth implant and some extractions    WISDOM TOOTH EXTRACTION         Family History   Problem Relation Age of Onset    Hyperlipidemia Mother     Osteoporosis Mother     Dementia Mother     Anxiety disorder Mother     Hypertension Father     Cancer Father 80        Throat cancer    Thyroid disease Father         Thyroid nodules,  Dec 2023    Arthritis Father     No Known Problems Sister     Substance Abuse Daughter     Mental illness Daughter     No Known Problems Maternal Grandmother     No Known Problems Maternal Grandfather     No Known Problems Paternal Grandmother     No Known Problems Paternal Grandfather     Hyperlipidemia Brother     No Known Problems Brother     No Known Problems Brother     Other Brother         meningiocele    No Known Problems Brother     Other Brother         missing in [place]    Mental illness Son     No Known Problems Maternal Aunt     No Known Problems Maternal Aunt     Breast cancer Paternal Aunt 60        fathers half sister; ? gyn ca as well?    No Known Problems Paternal Aunt     No Known Problems Paternal Aunt     Ovarian cancer Neg Hx     Colon cancer Neg Hx     Alcohol abuse Neg Hx     Uterine cancer Neg Hx          Medications have been  verified.        Objective   /60 (BP Location: Right arm, Patient Position: Sitting, Cuff Size: Standard)   Pulse 89   Temp (!) 100.9 °F (38.3 °C) (Tympanic)   Resp 16   Ht 5' (1.524 m)   Wt 57.3 kg (126 lb 6.4 oz)   LMP 12/23/2020 (Exact Date)   SpO2 97%   BMI 24.69 kg/m²   Patient's last menstrual period was 12/23/2020 (exact date).       Physical Exam     Physical Exam  Constitutional:       Appearance: She is ill-appearing.   HENT:      Right Ear: Tympanic membrane normal.      Left Ear: Tympanic membrane normal.      Nose: Rhinorrhea present.      Mouth/Throat:      Pharynx: No posterior oropharyngeal erythema.   Cardiovascular:      Rate and Rhythm: Regular rhythm.      Heart sounds: Normal heart sounds.   Pulmonary:      Effort: Pulmonary effort is normal.      Breath sounds: Normal breath sounds. No wheezing.   Lymphadenopathy:      Cervical: No cervical adenopathy.

## 2025-02-28 LAB
FLUAV RNA RESP QL NAA+PROBE: POSITIVE
FLUBV RNA RESP QL NAA+PROBE: NEGATIVE
SARS-COV-2 RNA RESP QL NAA+PROBE: NEGATIVE

## 2025-03-02 ENCOUNTER — RESULTS FOLLOW-UP (OUTPATIENT)
Dept: URGENT CARE | Facility: CLINIC | Age: 60
End: 2025-03-02

## 2025-04-21 ENCOUNTER — OFFICE VISIT (OUTPATIENT)
Dept: FAMILY MEDICINE CLINIC | Facility: CLINIC | Age: 60
End: 2025-04-21
Payer: COMMERCIAL

## 2025-04-21 VITALS
HEART RATE: 60 BPM | HEIGHT: 60 IN | TEMPERATURE: 97.7 F | DIASTOLIC BLOOD PRESSURE: 70 MMHG | SYSTOLIC BLOOD PRESSURE: 116 MMHG | WEIGHT: 127 LBS | BODY MASS INDEX: 24.94 KG/M2 | OXYGEN SATURATION: 99 % | RESPIRATION RATE: 16 BRPM

## 2025-04-21 DIAGNOSIS — R73.01 IFG (IMPAIRED FASTING GLUCOSE): ICD-10-CM

## 2025-04-21 DIAGNOSIS — M25.552 LEFT HIP PAIN: Primary | ICD-10-CM

## 2025-04-21 DIAGNOSIS — Z23 ENCOUNTER FOR IMMUNIZATION: ICD-10-CM

## 2025-04-21 DIAGNOSIS — Z13.220 SCREENING, LIPID: ICD-10-CM

## 2025-04-21 PROCEDURE — 90471 IMMUNIZATION ADMIN: CPT

## 2025-04-21 PROCEDURE — 99213 OFFICE O/P EST LOW 20 MIN: CPT | Performed by: PHYSICIAN ASSISTANT

## 2025-04-21 PROCEDURE — 90750 HZV VACC RECOMBINANT IM: CPT

## 2025-04-21 PROCEDURE — 99396 PREV VISIT EST AGE 40-64: CPT | Performed by: PHYSICIAN ASSISTANT

## 2025-04-21 RX ORDER — IRON HEME POLYPEPTIDE/FOLIC AC 12-1MG
5000 TABLET ORAL DAILY
COMMUNITY
Start: 2025-04-14

## 2025-04-21 NOTE — PROGRESS NOTES
Adult Annual Physical  Name: Romero Guerin      : 1965      MRN: [id number]  Encounter Provider: Estefany Ramirez PA-C  Encounter Date: 2025   Encounter department: [facility]    :  Assessment & Plan  Annual physical exam         Left hip pain    Hip vs radiation from back, will start with XR  Orders:    XR hip/pelv 4+ vw left if performed; Future        Preventive Screenings:  - Diabetes Screening: screening up-to-date  - Cholesterol Screening: screening up-to-date   - Hepatitis C screening: screening up-to-date   - Cervical cancer screening: screening up-to-date   - Breast cancer screening: screening up-to-date   - Colon cancer screening: screening up-to-date   - Lung cancer screening: screening not indicated     Immunizations:  - Immunizations due: Zoster (Shingrix)  - Risks/benefits immunizations discussed    - Immunizations given per orders      Counseling/Anticipatory Guidance:    - Dental health: discussed importance of regular tooth brushing, flossing, and dental visits.   - Diet: discussed recommendations for a healthy/well-balanced diet.   - Exercise: the importance of regular exercise/physical activity was discussed. Recommend exercise 3-5 times per week for at least 30 minutes.   - Injury prevention: discussed safety/seat belts, safety helmets, smoke detectors, carbon monoxide detectors, and smoking near bedding or upholstery.          History of Present Illness     Adult Annual Physical:  Patient presents for annual physical. Patient with left hip pain for a long time, a couple months, worse with certain strengthening, yoga poses. Back so stiff. Taking mobic and tylenol but that caused gastritis and had to back off. If standing all day back is ok but then tries to sit and then stand and is so stiff. Can feel right side back muscles spasm. Cannot taken Robaxin during day..     Diet and Physical Activity:  - Diet/Nutrition: portion control,  limited junk food, intermittent fasting and consuming 3-5 servings of fruits/vegetables daily.  - Exercise: no formal exercise.    Depression Screening:  - PHQ-2 Score: 0    General Health:  - Sleep: sleeps well and 4-6 hours of sleep on average.  - Hearing: normal hearing bilateral ears.  - Vision: no vision problems, most recent eye exam > 1 year ago and wears glasses.  - Dental: regular dental visits, brushes teeth twice daily and floss regularly.    /GYN Health:  - Follows with GYN: yes.   - Menopause: postmenopausal.   - History of STDs: yes  - Contraception: menopause.      Advanced Care Planning:  - Has an advanced directive?: no    - Has a durable medical POA?: no      Review of Systems   Constitutional: Negative.    HENT: Negative.     Eyes: Negative.    Respiratory: Negative.     Cardiovascular: Negative.    Gastrointestinal: Negative.    Endocrine: Negative.    Genitourinary: Negative.    Skin: Negative.    Allergic/Immunologic: Negative.    Neurological: Negative.    Hematological: Negative.    Psychiatric/Behavioral: Negative.           Objective   /70   Pulse 60   Temp 97.7 °F (36.5 °C) (Temporal)   Resp 16   Ht 5' (1.524 m)   Wt 57.6 kg (127 lb)   LMP 12/23/2020 (Exact Date)   SpO2 99%   BMI 24.80 kg/m²     Physical Exam  Constitutional:       Appearance: Normal appearance. She is well-developed and normal weight.   HENT:      Head: Normocephalic and atraumatic.      Right Ear: Hearing, tympanic membrane, ear canal and external ear normal.      Left Ear: Hearing, tympanic membrane, ear canal and external ear normal.      Nose: Nose normal.      Mouth/Throat:      Mouth: Mucous membranes are moist.      Pharynx: Oropharynx is clear. Uvula midline.   Eyes:      Extraocular Movements: Extraocular movements intact.      Conjunctiva/sclera: Conjunctivae normal.      Pupils: Pupils are equal, round, and reactive to light.   Neck:      Thyroid: No thyromegaly.   Cardiovascular:      Rate and  Rhythm: Normal rate and regular rhythm.      Pulses: Normal pulses.      Heart sounds: Normal heart sounds. No murmur heard.  Pulmonary:      Effort: Pulmonary effort is normal.      Breath sounds: Normal breath sounds.   Abdominal:      General: Abdomen is flat. Bowel sounds are normal. There is no distension.      Palpations: Abdomen is soft. There is no mass.      Tenderness: There is no abdominal tenderness.   Musculoskeletal:         General: Normal range of motion.      Cervical back: Normal range of motion and neck supple.   Lymphadenopathy:      Cervical: No cervical adenopathy.   Skin:     General: Skin is warm.   Neurological:      General: No focal deficit present.      Mental Status: She is alert and oriented to person, place, and time.      Cranial Nerves: No cranial nerve deficit.      Deep Tendon Reflexes: Reflexes normal.   Psychiatric:         Mood and Affect: Mood normal.         Behavior: Behavior normal.         Thought Content: Thought content normal.         Judgment: Judgment normal.

## 2025-04-22 ENCOUNTER — ANNUAL EXAM (OUTPATIENT)
Dept: OBGYN CLINIC | Facility: CLINIC | Age: 60
End: 2025-04-22

## 2025-04-22 VITALS
BODY MASS INDEX: 24.81 KG/M2 | SYSTOLIC BLOOD PRESSURE: 100 MMHG | DIASTOLIC BLOOD PRESSURE: 60 MMHG | HEIGHT: 60 IN | WEIGHT: 126.4 LBS

## 2025-04-22 DIAGNOSIS — Z12.31 ENCOUNTER FOR SCREENING MAMMOGRAM FOR BREAST CANCER: ICD-10-CM

## 2025-04-22 DIAGNOSIS — B00.1 COLD SORE: ICD-10-CM

## 2025-04-22 DIAGNOSIS — Z01.419 ENCOUNTER FOR GYNECOLOGICAL EXAMINATION WITHOUT ABNORMAL FINDING: Primary | ICD-10-CM

## 2025-04-22 RX ORDER — VALACYCLOVIR HYDROCHLORIDE 1 G/1
1000 TABLET, FILM COATED ORAL 2 TIMES DAILY
Qty: 10 TABLET | Refills: 3 | Status: SHIPPED | OUTPATIENT
Start: 2025-04-22 | End: 2025-04-23

## 2025-04-22 NOTE — PROGRESS NOTES
Assessment / Plan    Assessment & Plan  Encounter for gynecological examination without abnormal finding  Normal well woman exam.  12/2021 pap/hpv negative.  Repeat 2026.  Up to date on colonoscopy       Encounter for screening mammogram for breast cancer  Has order and appointment.       Cold sore    Orders:    valACYclovir (VALTREX) 1,000 mg tablet; Take 1 tablet (1,000 mg total) by mouth 2 (two) times a day for 1 day        Latisha Brito is a 59 y.o. female who presents for her annual gynecologic exam.    12/2021 pap/hpv negative  Pap Hx: NL  STD hx: GC, ? Syphilis- 1987  Sexually active: not currently; last time 3 yrs ago  5/2024 mammo negative; has scheduled 5/5/25  Colonoscopy- 3/2017, 10 yr update  Body mass index is 24.69 kg/m².  Calcium intake: inadequate; given guidelines  Exercise: unable to due to lower back pain; given guidelines  Safety: feels safe     Periods are absent  Current contraception: post menopausal status  History of abnormal Pap smear: no  Family history of breast,uterine, ovarian or colon cancer: breast ca pat aunt      The following portions of the patient's history were reviewed and updated as appropriate: allergies, current medications, past family history, past medical history, past social history, past surgical history, and problem list.    Review of Systems      Review of Systems   Constitutional:  Negative for chills and fever.   Respiratory:  Negative for cough and shortness of breath.    Gastrointestinal:  Negative for abdominal distention, abdominal pain, blood in stool, constipation, diarrhea, nausea and vomiting.   Genitourinary:  Negative for difficulty urinating, dysuria, frequency, genital sores, hematuria, menstrual problem, pelvic pain, urgency, vaginal bleeding and vaginal discharge.   Musculoskeletal:  Negative for arthralgias and myalgias.     Breasts:  Negative for skin changes, dimpling, asymmetry, nipple discharge, redness, tenderness or palpable  masses      Objective     *patient agrees to exam without a chaperone present.     /60 (BP Location: Right arm, Patient Position: Sitting, Cuff Size: Standard)   Ht 5' (1.524 m)   Wt 57.3 kg (126 lb 6.4 oz)   LMP 12/23/2020 (Exact Date)   BMI 24.69 kg/m²   Physical Exam  Constitutional:       General: She is not in acute distress.     Appearance: Normal appearance. She is well-developed and normal weight. She is not ill-appearing or diaphoretic.   HENT:      Head: Normocephalic and atraumatic.   Eyes:      Pupils: Pupils are equal, round, and reactive to light.   Neck:      Thyroid: No thyromegaly.   Pulmonary:      Effort: Pulmonary effort is normal.   Chest:   Breasts:     Breasts are symmetrical.      Right: No inverted nipple, mass, nipple discharge, skin change or tenderness.      Left: No inverted nipple, mass, nipple discharge, skin change or tenderness.   Abdominal:      General: There is no distension.      Palpations: Abdomen is soft. There is no mass.      Tenderness: There is no abdominal tenderness. There is no guarding or rebound.   Genitourinary:     General: Normal vulva.      Exam position: Lithotomy position.      Labia:         Right: No rash, tenderness, lesion or injury.         Left: No rash, tenderness, lesion or injury.       Vagina: No signs of injury and foreign body. No vaginal discharge, erythema, tenderness or bleeding.      Cervix: No cervical motion tenderness, discharge or friability.      Uterus: Not enlarged and not tender.       Adnexa:         Right: No mass or tenderness.          Left: No mass or tenderness.        Rectum: Normal.   Musculoskeletal:      Cervical back: Neck supple.   Lymphadenopathy:      Cervical: No cervical adenopathy.      Upper Body:      Right upper body: No supraclavicular adenopathy.      Left upper body: No supraclavicular adenopathy.   Skin:     General: Skin is warm and dry.   Neurological:      General: No focal deficit present.      Mental  Status: She is alert and oriented to person, place, and time.   Psychiatric:         Mood and Affect: Mood normal.         Behavior: Behavior normal.         Thought Content: Thought content normal.         Judgment: Judgment normal.

## 2025-04-22 NOTE — PATIENT INSTRUCTIONS
Patient Education     Calcium Rich Diet   About this topic   Calcium is one of the most important minerals and is found in almost all parts of your body. It makes your teeth and bones strong and healthy. Your body does not make calcium. It is important for you to eat calcium rich foods to get enough calcium. It also helps your body:  Make blood clots  Keep your heartbeat normal  Helps blood move throughout the body  Release hormones  Release enzymes  Send and get signals between your nerves and brain  Make muscles work well         What will the results be?   It is important to have a good amount of calcium in your food. Calcium helps your body work well. It is very important during every life stage. Calcium may also keep you from losing bone mass. This is osteopenia. It may help keep you from having weak bones. This is osteoporosis.  What drugs may be needed?   The doctor may order calcium and vitamin D supplements. You need vitamin D to help your body take in the calcium. Your calcium supplement may have vitamin D in it.  Talk to your doctor about:  If it is OK to take your calcium with food.  How often to take your calcium supplement throughout the day.  All the drugs you are taking. Be sure to include all prescription and over-the-counter (OTC) drugs, and herbal supplements. Tell the doctor about any drug allergy. Bring a list of drugs you take with you. Some drugs may cause problems with how your body takes in calcium.  Will physical activity be limited?   No, physical activities will not be limited. It is good for you to do light exercises and to stay active.  What changes to diet are needed?   You will need to watch how much calcium is in the foods you eat. Your doctor will talk to you about the right amount of calcium for you. How much calcium you need is based on your age and life stage.  When is this diet used?   This diet is used when your normal diet is low in calcium. It is needed to raise the amount of  calcium in your diet. Our bodies do not take in calcium well as we get older.  Who should not use this diet?   People with too much calcium in their blood should not use this diet. This is called hypercalcemia.  What foods are good to eat?   Milk, yogurt, and cheese products are naturally high in calcium.  These foods have smaller amounts of calcium. If they are eaten often and in large amounts they can be good sources of calcium:  Oysters; dried fruit like figs and raisins; green leafy vegetables like broccoli, collards, kale, mustard greens, bok mateusz; soy beans; oranges  Whitney and sardines. Be sure to eat the soft bones.  Nuts like almonds and Brazil nuts, sunflower seeds, tahini, dried beans  Food products with calcium added to them like orange juice, tofu, cereal, bread; almond milk, rice milk, soy milk  What foods should be limited or avoided?   People who eat many kinds of foods do not have to be worried about limiting or avoiding certain foods.   What problems could happen?   Some people may get kidney stones. This may happen after taking high amounts of calcium over a long time. Calcium from food does not seem to cause kidney stones.  Hard stools.  Too much calcium may interfere with your body’s ability to absorb iron and zinc.  When do I need to call the doctor?   Call your doctor if you have concerns about your diet. Tell your doctor if you are allergic to any of the foods in your diet.  Helpful tips   If you have problems digesting milk, try lactose-free milk. You may also use a product to help you take in lactose.  Talk with your doctor if you are a vegetarian and do not eat dairy products. Your doctor can help you make sure you get the amount of calcium your body needs.  Last Reviewed Date   2021-03-12  Consumer Information Use and Disclaimer   This generalized information is a limited summary of diagnosis, treatment, and/or medication information. It is not meant to be comprehensive and should be used  as a tool to help the user understand and/or assess potential diagnostic and treatment options. It does NOT include all information about conditions, treatments, medications, side effects, or risks that may apply to a specific patient. It is not intended to be medical advice or a substitute for the medical advice, diagnosis, or treatment of a health care provider based on the health care provider's examination and assessment of a patient’s specific and unique circumstances. Patients must speak with a health care provider for complete information about their health, medical questions, and treatment options, including any risks or benefits regarding use of medications. This information does not endorse any treatments or medications as safe, effective, or approved for treating a specific patient. UpToDate, Inc. and its affiliates disclaim any warranty or liability relating to this information or the use thereof. The use of this information is governed by the Terms of Use, available at https://www.[employer].ShareTracker/en/know/clinical-effectiveness-terms   Copyright   Copyright © 2024 UpToDate, Inc. and its affiliates and/or licensors. All rights reserved.  Patient Education     Exercise and movement   The Basics   Written by the doctors and editors at [employer]   What are the benefits of movement? -- Moving your body has many benefits. It can:   Burn calories, which helps people manage their weight   Help control blood sugar levels in people with diabetes   Lower blood pressure, especially in people with high blood pressure   Lower stress, and help with depression and anxiety   Keep bones strong, so they don't get thin and break easily   Lower the chance of dying from heart disease  Adding even small amounts of physical activity to your daily routine can improve your health.  What are the main types of exercise? -- There are 3 main types of exercise:   Aerobic exercise - This raises your heart rate. Examples include  walking, running, dancing, riding a bike, and swimming.   Muscle strengthening - This helps make your muscles stronger. You can do it using weights, exercise bands, or weight machines. You can also use your own body weight, as with push-ups, or by lifting items in your home, like jugs of water.   Stretching - These help your muscles and joints move more easily.  It's important to have all 3 types in your exercise program. That way, your body, muscles, and joints can be as healthy as possible.  Should I talk to my doctor or nurse before I start exercising? -- If you have not exercised before or have not exercised in a long time, talk with your doctor or nurse before you start a very active exercise program.  If you have heart disease or risk factors for heart disease (like high blood pressure or diabetes), your doctor or nurse might recommend that you have an exercise test before starting an exercise program.  When you begin an exercise program, start slowly. For example, do the exercise at a slow pace or for a few minutes only. Over time, you can exercise faster and for longer periods of time.  What should I do when I exercise? -- Each time you exercise, you should:   Warm up - This can help keep you from hurting your muscles when you exercise. To warm up, do a light aerobic exercise (such as walking slowly) or stretch for 5 to 10 minutes.   Work out - Try to get a mix of aerobic exercise, muscle strengthening, and stretching. During an aerobic workout, you can walk fast, swim, run, or use an exercise machine, for example. Other activities, like dancing or playing tennis, are also forms of aerobic exercise. You should also take time to stretch all of your joints, including your neck, shoulders, back, hips, and knees. At least 2 times a week, you can do muscle strengthening exercises as part of your workout.   Cool down - This helps keep you from feeling dizzy after you exercise and helps prevent muscle cramps. To  cool down, you can stretch or do a light aerobic exercise for 5 minutes.  Some people go to a gym or do group exercise classes. But you can exercise even without these things. Some exercises can be done even in a small space. You can also try online videos or smartphone apps to get ideas for different types of exercise.  How often should I exercise? -- Doctors recommend that people exercise at least 30 minutes a day, on 5 or more days of the week.  If you can't exercise for 30 minutes straight, try to exercise for 10 minutes at a time, 3 or 4 times a day. Even exercising for shorter amounts of time is good for you, especially if it means spending less time sitting.  When should I call my doctor or nurse? -- If you have any of the following symptoms when you exercise, stop exercising and call your doctor or nurse right away:   Pain or pressure in your chest, arms, throat, jaw, or back   Nausea or vomiting   Feeling like your heart is fluttering or racing very fast   Feeling dizzy or faint  What if I don't have time to exercise? -- Many people have very busy lives and might not think that they have time to exercise. But it's important to try to find time, even if you are tired or work a lot. Exercise can increase your energy level, which can make you feel better and might even help you get more work done.  Even if it's hard to set aside a lot of time to exercise, you can still improve your health by moving your body more. There are many ways that you can be more active. For example, you can:   Take the stairs instead of the elevator.   Park in a parking space that is farther away from the door.   Take a longer route when you walk from one place to another.  Spending a lot of time sitting still (for example, watching TV or working on the computer) is bad for your health. Try to get up and move around whenever you can. Even small amounts of movement, like taking short walks, doing household chores, or gardening, can  improve your health. Finding activities that you enjoy, or doing them with other people, can help you add more movement into your daily life.  What else should I do when I exercise? -- To exercise safely and avoid problems, it's important to:   Drink fluids during and after exercising (but avoid drinks with a lot of caffeine or sugar).   Avoid exercising outside if it is too hot or cold.   Wear layers of clothes, so that you can take them off if you get too hot.   Wear shoes that fit well and support your feet.   Be aware of your surroundings if you exercise outside.  All topics are updated as new evidence becomes available and our peer review process is complete.  This topic retrieved from [employer] on: May 18, 2024.  Topic 13206 Version 31.0  Release: 32.4.3 - C32.137  © 2024 UpToDate, Inc. and/or its affiliates. All rights reserved.  Consumer Information Use and Disclaimer   Disclaimer: This generalized information is a limited summary of diagnosis, treatment, and/or medication information. It is not meant to be comprehensive and should be used as a tool to help the user understand and/or assess potential diagnostic and treatment options. It does NOT include all information about conditions, treatments, medications, side effects, or risks that may apply to a specific patient. It is not intended to be medical advice or a substitute for the medical advice, diagnosis, or treatment of a health care provider based on the health care provider's examination and assessment of a patient's specific and unique circumstances. Patients must speak with a health care provider for complete information about their health, medical questions, and treatment options, including any risks or benefits regarding use of medications. This information does not endorse any treatments or medications as safe, effective, or approved for treating a specific patient. UpToDate, Inc. and its affiliates disclaim any warranty or liability relating to  this information or the use thereof.The use of this information is governed by the Terms of Use, available at https://www.wolterskluwer.com/en/know/clinical-effectiveness-terms. 2024© UpToDate, Inc. and its affiliates and/or licensors. All rights reserved.  Copyright   © 2024 [employer] and/or its affiliates. All rights reserved.

## 2025-05-05 ENCOUNTER — HOSPITAL ENCOUNTER (OUTPATIENT)
Dept: MAMMOGRAPHY | Facility: CLINIC | Age: 60
Discharge: HOME | End: 2025-05-05
Payer: COMMERCIAL

## 2025-05-05 DIAGNOSIS — Z12.31 ENCOUNTER FOR SCREENING MAMMOGRAM FOR BREAST CANCER: ICD-10-CM

## 2025-05-05 PROCEDURE — 77063 BREAST TOMOSYNTHESIS BI: CPT

## 2025-05-05 PROCEDURE — 77067 SCR MAMMO BI INCL CAD: CPT

## 2025-05-30 ENCOUNTER — APPOINTMENT (OUTPATIENT)
Dept: RADIOLOGY | Facility: CLINIC | Age: 60
End: 2025-05-30
Payer: COMMERCIAL

## 2025-05-30 DIAGNOSIS — M25.552 LEFT HIP PAIN: ICD-10-CM

## 2025-05-30 PROCEDURE — 73502 X-RAY EXAM HIP UNI 2-3 VIEWS: CPT

## 2025-06-07 DIAGNOSIS — M54.50 ACUTE LEFT-SIDED LOW BACK PAIN WITHOUT SCIATICA: ICD-10-CM

## 2025-06-10 RX ORDER — METHOCARBAMOL 750 MG/1
750 TABLET, FILM COATED ORAL EVERY 6 HOURS PRN
Qty: 30 TABLET | Refills: 0 | Status: SHIPPED | OUTPATIENT
Start: 2025-06-10

## 2025-06-11 ENCOUNTER — RESULTS FOLLOW-UP (OUTPATIENT)
Dept: FAMILY MEDICINE CLINIC | Facility: CLINIC | Age: 60
End: 2025-06-11

## 2025-07-06 ENCOUNTER — OFFICE VISIT (OUTPATIENT)
Dept: URGENT CARE | Facility: CLINIC | Age: 60
End: 2025-07-06
Payer: COMMERCIAL

## 2025-07-06 VITALS
TEMPERATURE: 97.9 F | OXYGEN SATURATION: 98 % | RESPIRATION RATE: 18 BRPM | DIASTOLIC BLOOD PRESSURE: 70 MMHG | HEART RATE: 66 BPM | SYSTOLIC BLOOD PRESSURE: 106 MMHG

## 2025-07-06 DIAGNOSIS — T63.481A INSECT STINGS, ACCIDENTAL OR UNINTENTIONAL, INITIAL ENCOUNTER: Primary | ICD-10-CM

## 2025-07-06 PROCEDURE — 99213 OFFICE O/P EST LOW 20 MIN: CPT | Performed by: PHYSICIAN ASSISTANT

## 2025-07-06 RX ORDER — PREDNISONE 20 MG/1
40 TABLET ORAL DAILY
Qty: 10 TABLET | Refills: 0 | Status: SHIPPED | OUTPATIENT
Start: 2025-07-06 | End: 2025-07-11

## 2025-07-06 NOTE — PROGRESS NOTES
"[facility]  Name: Romero Guerin      : 1965      MRN: [id number]  Encounter Provider: GERARDO ARITA  Encounter Date: 2025   Encounter department: [facility] [place]  :  Assessment & Plan  Insect stings, accidental or unintentional, initial encounter    Orders:    predniSONE 20 mg tablet; Take 2 tablets (40 mg total) by mouth daily for 5 days        Patient Instructions  Follow up with PCP in 3-5 days.  Proceed to  ER if symptoms worsen.    If tests are performed, our office will contact you with results only if changes need to made to the care plan discussed with you at the visit. [facility]    Chief Complaint:   Chief Complaint   Patient presents with    Insect Bite     Patient states \"little bees\" stung her 3 times on her R arm. Patient states today, she is having numbness and redness on her R arm. Patient also states \"it makes my fingers come together.\"      History of Present Illness   Patient was stung by a bee yesterday.  Developed reaction to it.  Yesterday the whole arm hurt but that resolve a little.  Today with swelling and numbness of the forearm and hand. Was stung over the bicep, forearm, and wrist.   Denies fevers, headaches, visual disturbances, chest pains, SOB, dyspnea, dizziness, weakness.     Insect Bite  Associated symptoms include numbness. Pertinent negatives include no chest pain, fever, headaches or weakness.         Review of Systems   Constitutional:  Negative for fever.   HENT:  Negative for trouble swallowing.    Eyes:  Negative for visual disturbance.   Respiratory:  Negative for shortness of breath.    Cardiovascular:  Negative for chest pain.   Neurological:  Positive for numbness. Negative for dizziness, weakness and headaches.     Past Medical History   Past Medical History[1]  Past Surgical History[2]  Family History[3]  she reports that she has never smoked. She has never used smokeless tobacco. She " "reports current alcohol use. She reports that she does not use drugs.  Current Outpatient Medications   Medication Instructions    benzonatate (TESSALON) 200 mg, Oral, 3 times daily PRN    Biotin 1,000 mcg, Daily    calcium carbonate (Calcium 600) 600 MG tablet     Dialyvite Vitamin D 5000 5,000 Units, Daily    fexofenadine (ALLEGRA) 180 mg, As needed    fluticasone (FLONASE) 50 mcg/act nasal spray 1 spray, As needed    ibuprofen (MOTRIN) 200 mg tablet Every 6 hours PRN    loratadine (CLARITIN) 10 mg, As needed    magnesium oxide-pyridoxine (BEELITH) 362-20 MG TABS 1 tablet, Daily    meclizine (ANTIVERT) 12.5 MG tablet 3 times daily PRN    meloxicam (MOBIC) 15 mg, Oral, Daily    methocarbamol (ROBAXIN) 750 mg, Oral, Every 6 hours PRN    Multiple Vitamin (MULTIVITAMIN) capsule 1 capsule, Daily    ondansetron (ZOFRAN ODT) 4 mg, Oral, Every 6 hours PRN    predniSONE 40 mg, Oral, Daily    Turmeric (QC TUMERIC COMPLEX PO) Take by mouth    valACYclovir (VALTREX) 1,000 mg, Oral, 2 times daily   Allergies[4]     Objective   /70   Pulse 66   Temp 97.9 °F (36.6 °C)   Resp 18   LMP 12/23/2020 (Exact Date)   SpO2 98%      Physical Exam  Constitutional:       Appearance: Normal appearance.     Musculoskeletal:         General: No swelling or tenderness. Normal range of motion.      Comments: Full active range of motion 5 out of 5 muscle strength of the upper extremities bilaterally.  Neurovascularly distally intact in the right arm.  +3 radial pulse.     Skin:          Comments: 3 punctate's with surrounding erythema over the right forearm, right wrist and right inner upper arm     Neurological:      Mental Status: She is alert.     Psychiatric:         Mood and Affect: Mood normal.         Behavior: Behavior normal.         Portions of the record may have been created with voice recognition software.  Occasional wrong word or \"sound a like\" substitutions may have occurred due to the inherent limitations of voice " recognition software.  Read the chart carefully and recognize, using context, where substitutions have occurred.         [1]   Past Medical History:  Diagnosis Date    Allergic 1991    Cluster headache     Dental disease 23    Sensitive right upper    Dizziness ongoing    on and off    Ear problems 5/15/22    right side    GERD (gastroesophageal reflux disease)     Gonorrhea 1987    Headache(784.0)     Headache, tension-type     Hepatitis B 1989    Herpes     PERIORAL    Infectious viral hepatitis     Hepatitis B--treated    Migraine     Miscarriage     Syphilis     patient is unsure of this, same incident as what she thought was herpes   [2]   Past Surgical History:  Procedure Laterality Date     SECTION      COLONOSCOPY      2017-wnl repeat 10 years    FINGER SURGERY      MOUTH SURGERY      tooth implant and some extractions    WISDOM TOOTH EXTRACTION     [3]   Family History  Problem Relation Name Age of Onset    Hyperlipidemia Mother TEJAS VU     Osteoporosis Mother TEJAS VU     Dementia Mother TEJAS VU     Anxiety disorder Mother TEJAS WHITE     Hypertension Father Lawson Guerin     Cancer Father Lawson Guerin 80         2023, tonsil cancer    Thyroid disease Father Lawson Guerin         Thyroid nodules,  Dec 2023    Arthritis Father Lawson Guerin     No Known Problems Sister      Substance Abuse Daughter      Mental illness Daughter      No Known Problems Maternal Grandmother      No Known Problems Maternal Grandfather      No Known Problems Paternal Grandmother      No Known Problems Paternal Grandfather      Hyperlipidemia Brother Vu     No Known Problems Brother Vincent     No Known Problems Brother Van     Other Brother Fritz         meningiocele    No Known Problems Brother Александр     Other Brother Chidi         missing in [place]    Mental illness Son      No Known Problems Maternal Aunt      No Known Problems Maternal Aunt      Breast cancer Paternal Aunt  Saundra Guerin 60        fathers half sister; ? gyn ca as well?    No Known Problems Paternal Aunt      No Known Problems Paternal Aunt      Ovarian cancer Neg Hx      Colon cancer Neg Hx      Alcohol abuse Neg Hx      Uterine cancer Neg Hx     [4]   Allergies  Allergen Reactions    Sulfamethoxazole-Trimethoprim Hives, Itching and Rash

## 2025-07-06 NOTE — PATIENT INSTRUCTIONS
Follow-up with your primary care provider in the next 3-5 days.  Any new or worsening symptoms develop get re-evaluated sooner or proceed to the ER.  Take prednisone as prescribed.

## 2025-07-29 ENCOUNTER — APPOINTMENT (OUTPATIENT)
Dept: LAB | Facility: HOSPITAL | Age: 60
End: 2025-07-29

## 2025-07-29 DIAGNOSIS — Z00.8 HEALTH EXAMINATION IN POPULATION SURVEY: ICD-10-CM

## 2025-07-29 LAB
CHOLEST SERPL-MCNC: 181 MG/DL (ref ?–200)
EST. AVERAGE GLUCOSE BLD GHB EST-MCNC: 128 MG/DL
HBA1C MFR BLD: 6.1 %
HDLC SERPL-MCNC: 72 MG/DL
LDLC SERPL CALC-MCNC: 81 MG/DL (ref 0–100)
NONHDLC SERPL-MCNC: 109 MG/DL
TRIGL SERPL-MCNC: 139 MG/DL (ref ?–150)

## 2025-07-29 PROCEDURE — 36415 COLL VENOUS BLD VENIPUNCTURE: CPT

## 2025-07-29 PROCEDURE — 83036 HEMOGLOBIN GLYCOSYLATED A1C: CPT

## 2025-07-29 PROCEDURE — 80061 LIPID PANEL: CPT
